# Patient Record
Sex: FEMALE | Race: WHITE | HISPANIC OR LATINO | Employment: FULL TIME | URBAN - METROPOLITAN AREA
[De-identification: names, ages, dates, MRNs, and addresses within clinical notes are randomized per-mention and may not be internally consistent; named-entity substitution may affect disease eponyms.]

---

## 2020-03-17 ENCOUNTER — INITIAL PRENATAL (OUTPATIENT)
Dept: OBGYN | Facility: CLINIC | Age: 20
End: 2020-03-17
Payer: MEDICAID

## 2020-03-17 VITALS — WEIGHT: 164 LBS | DIASTOLIC BLOOD PRESSURE: 68 MMHG | SYSTOLIC BLOOD PRESSURE: 104 MMHG

## 2020-03-17 DIAGNOSIS — N93.8 DUB (DYSFUNCTIONAL UTERINE BLEEDING): ICD-10-CM

## 2020-03-17 DIAGNOSIS — Z32.01 POSITIVE PREGNANCY TEST: ICD-10-CM

## 2020-03-17 LAB
INT CON NEG: NEGATIVE
INT CON POS: POSITIVE
POC URINE PREGNANCY TEST: POSITIVE

## 2020-03-17 PROCEDURE — 81025 URINE PREGNANCY TEST: CPT | Performed by: OBSTETRICS & GYNECOLOGY

## 2020-03-17 PROCEDURE — 76830 TRANSVAGINAL US NON-OB: CPT | Performed by: OBSTETRICS & GYNECOLOGY

## 2020-03-17 PROCEDURE — 99203 OFFICE O/P NEW LOW 30 MIN: CPT | Mod: 25 | Performed by: OBSTETRICS & GYNECOLOGY

## 2020-03-17 NOTE — LETTER
March 17, 2020            Yumiko Obrien is currently being cared for at [facility].  This patient is pregnant and may continue to work. EDC 10/20/20. She should not lift greater than 20 pounds and requires frequent rest periods (10 minutes every two hours).        Thank you,          Wade Bustamante M.D., FACOG

## 2020-03-17 NOTE — PROGRESS NOTES
Subjective:      Yumiko Obrien is a 19 y.o. female who presents with amenorrhea and positive home pregnancy test            HPI patient is a 19-year-old G1 who presents today with amenorrhea and positive home pregnancy test.  Pregnancy is desired and patient was not using any contraception.  She denies any pain, bleeding, spotting or headaches.  Reports normal bowel and bladder functions.  Patient is taking prenatal vitamins.    She reports some mild intermittent nausea but symptoms have been improving    ROS all organ systems were reviewed and were negative except for complaints in HPI       Objective:     /68   Wt 74.4 kg (164 lb)      Physical Exam  Vitals signs and nursing note reviewed. Exam conducted with a chaperone present.   Constitutional:       General: She is not in acute distress.     Appearance: Normal appearance. She is obese. She is not toxic-appearing.   HENT:      Head: Normocephalic and atraumatic.   Eyes:      General:         Right eye: No discharge.         Left eye: No discharge.      Conjunctiva/sclera: Conjunctivae normal.   Neck:      Musculoskeletal: Normal range of motion and neck supple. No neck rigidity.   Cardiovascular:      Rate and Rhythm: Normal rate and regular rhythm.      Pulses: Normal pulses.      Heart sounds: No murmur.   Pulmonary:      Effort: Pulmonary effort is normal. No respiratory distress.      Breath sounds: Normal breath sounds. No wheezing.   Abdominal:      General: Abdomen is flat. Bowel sounds are normal. There is no distension.      Palpations: Abdomen is soft.      Tenderness: There is no abdominal tenderness.   Genitourinary:     General: Normal vulva.      Vagina: No vaginal discharge.   Musculoskeletal: Normal range of motion.      Right lower leg: No edema.      Left lower leg: No edema.   Lymphadenopathy:      Cervical: No cervical adenopathy.   Skin:     General: Skin is warm and dry.      Coloration: Skin is not jaundiced.      Findings: No  bruising.   Neurological:      General: No focal deficit present.      Mental Status: She is alert and oriented to person, place, and time.      Cranial Nerves: No cranial nerve deficit.      Motor: No weakness.   Psychiatric:         Mood and Affect: Mood normal.         Behavior: Behavior normal.         Thought Content: Thought content normal.         Judgment: Judgment normal.            Transvaginal ultrasound was performed and read by me:    Melendez intrauterine pregnancy noted  Fetal heart rate was 173 bpm  Crown-rump length measurement gives a gestational age of 9 weeks and 0-day  EDC by CRL is 10/20/2020  EDC by LMP is 2020  No adnexal masses noted  No pelvic free fluid noted    Impression: Melendez intrauterine pregnancy at 9 weeks gestation with final EDC of 10/20/2020.     Assessment/Plan:       1.  Secondary amenorrhea  19-year-old  1 presenting with secondary amenorrhea and positive home pregnancy test.  Melendez intrauterine pregnancy confirmed by ultrasound at 9 weeks gestation.  Findings are discussed  - POCT Pregnancy  Pregnancy precautions and restrictions were reviewed.  Note given for work for lifting restrictions  Patient counseled on dietary modification to aid in nausea  Patient to continue prenatal vitamins and increase p.o. fluids    2. Positive pregnancy test    3.  Precautions and plan of care reviewed.  Patient to follow-up in 1 week for new OB exam

## 2020-04-14 ENCOUNTER — INITIAL PRENATAL (OUTPATIENT)
Dept: OBGYN | Facility: CLINIC | Age: 20
End: 2020-04-14
Payer: MEDICAID

## 2020-04-14 VITALS
SYSTOLIC BLOOD PRESSURE: 110 MMHG | DIASTOLIC BLOOD PRESSURE: 68 MMHG | HEART RATE: 75 BPM | HEIGHT: 64 IN | WEIGHT: 164.6 LBS | BODY MASS INDEX: 28.1 KG/M2

## 2020-04-14 DIAGNOSIS — O23.40 URINARY TRACT INFECTION AFFECTING PREGNANCY: ICD-10-CM

## 2020-04-14 DIAGNOSIS — Z34.02 ENCOUNTER FOR SUPERVISION OF NORMAL FIRST PREGNANCY, SECOND TRIMESTER: ICD-10-CM

## 2020-04-14 LAB
APPEARANCE UR: NORMAL
BILIRUB UR STRIP-MCNC: NORMAL MG/DL
COLOR UR AUTO: NORMAL
GLUCOSE UR STRIP.AUTO-MCNC: NEGATIVE MG/DL
KETONES UR STRIP.AUTO-MCNC: NEGATIVE MG/DL
LEUKOCYTE ESTERASE UR QL STRIP.AUTO: NORMAL
NITRITE UR QL STRIP.AUTO: POSITIVE
PH UR STRIP.AUTO: 6 [PH] (ref 5–8)
PROT UR QL STRIP: NEGATIVE MG/DL
RBC UR QL AUTO: NEGATIVE
SP GR UR STRIP.AUTO: 1.02
UROBILINOGEN UR STRIP-MCNC: NORMAL MG/DL

## 2020-04-14 PROCEDURE — 81002 URINALYSIS NONAUTO W/O SCOPE: CPT | Performed by: ADVANCED PRACTICE MIDWIFE

## 2020-04-14 PROCEDURE — 59402 PR NEW OB HIGH RISK: CPT | Performed by: ADVANCED PRACTICE MIDWIFE

## 2020-04-14 RX ORDER — NITROFURANTOIN 25; 75 MG/1; MG/1
100 CAPSULE ORAL 2 TIMES DAILY
Qty: 14 CAP | Refills: 0 | Status: SHIPPED | OUTPATIENT
Start: 2020-04-14 | End: 2020-09-08

## 2020-04-14 NOTE — PROGRESS NOTES
Subjective:   Yumiko Obrien is a 19 y.o.  who presents for her new OB exam.  She is 13w0d with an KATHLEEN of Estimated Date of Delivery: 10/20/20 by US. She is feeling well and has no concerns at this time. Denies VB, LOF, contractions or pain. She reports no ER visits or previous care in this pregnancy. Denies dysuria, vaginal DC, fever. Reports absent fetal movement. Unsure AFP.  Declines CF.      History reviewed. No pertinent past medical history.    Psych Hx: Patient denies any history of depression, anxiety, PTSD, bipolar or any other psychological issues.     History reviewed. No pertinent surgical history.     OB History    Para Term  AB Living   1             SAB TAB Ectopic Molar Multiple Live Births                    # Outcome Date GA Lbr Joel/2nd Weight Sex Delivery Anes PTL Lv   1 Current                 Gynecological Hx: Denies any hx of STIs, including HSV. Denies any vulvovaginal disorders and no hx of abnormal cervical cytology. Last pap never    Sexual Hx: One current male partner, who is  FOB     Family History   Problem Relation Age of Onset   • No Known Problems Mother    • No Known Problems Father    • No Known Problems Sister    • No Known Problems Brother    • Diabetes Maternal Grandmother      Denies any genetic disorders in family history.     Social History     Socioeconomic History   • Marital status: Single     Spouse name: Not on file   • Number of children: Not on file   • Years of education: Not on file   • Highest education level: Not on file   Occupational History   • Not on file   Social Needs   • Financial resource strain: Not on file   • Food insecurity     Worry: Not on file     Inability: Not on file   • Transportation needs     Medical: Not on file     Non-medical: Not on file   Tobacco Use   • Smoking status: Never Smoker   • Smokeless tobacco: Never Used   Substance and Sexual Activity   • Alcohol use: Not Currently   • Drug use: Not Currently   • Sexual  "activity: Yes     Partners: Male     Birth control/protection: None     Comment: none   Lifestyle   • Physical activity     Days per week: Not on file     Minutes per session: Not on file   • Stress: Not on file   Relationships   • Social connections     Talks on phone: Not on file     Gets together: Not on file     Attends Mandaen service: Not on file     Active member of club or organization: Not on file     Attends meetings of clubs or organizations: Not on file     Relationship status: Not on file   • Intimate partner violence     Fear of current or ex partner: Not on file     Emotionally abused: Not on file     Physically abused: Not on file     Forced sexual activity: Not on file   Other Topics Concern   • Not on file   Social History Narrative   • Not on file       FOB is involved and lives with Yumiko Obrien.  Pregnancy is un planned but desired.    She is currently  working as  at [employer] , denies any heavy lifting or exposure to potential teratogens like environmental or occupational toxins.   Denies alcohol use, drug use, or tobacco use in pregnancy.   Denies any current or hx of sexual, emotional or physical abuse or trauma.     Current Medications: PNV  Allergies: Denies allergies to medications, food, or environmental allergies    Objective:      Vitals:    04/14/20 1521   Weight: 74.7 kg (164 lb 9.6 oz)   Height: 1.626 m (5' 4\")        See Prenatal Physical and Prenatal Vitals  UA WNL today      Assessment:      1.  IUP @ 13w0d per US      2.  S=D      3.  See problem list as follows     There are no active problems to display for this patient.        Plan:   - Discussed with patient that at current, there are no true recommendations regarding exposure to COVID outside of the importance of decreasing transmission. There are no long term studies indicating if there are any effects on the pregnancy. At current, she may continue working. She is to report any fever of unknown origin.     -UTI " noted today and Rx sent to pharmacy. Educated patient on this. No sex while taking the medication and we will perform AYANNA at next visit.     -  GC/CT off urine  - Prenatal labs ordered - lab slip provided  - Discussed PNV, nutrition, adequate water intake, and exercise/weight gain in pregnancy  - NOB informational packet with anticipatory guidance given  - Reviewed [facility] and patient is loren. Wanda to offer.  - S/sx of pregnancy warning signs and PTL precautions given  - Complete OB US in 7 wks  - Return to clinic in 4 weeks.

## 2020-04-14 NOTE — PROGRESS NOTES
Pt. Here for NOB visit.  # [phone]  Pt had a  on 3/17/2020  Pt. States no complaints.   Pharmacy verified.   Chaperone offered and not needed.

## 2020-04-14 NOTE — LETTER
Cystic Fibrosis Carrier Testing  Yumiko Obrien    The following information is about a blood test that can be done to determine if you and/or your partner carry the gene for cystic fibrosis.    WHAT IS CYSTIC FIBROSIS?  · Cystic fibrosis (CF) is an inherited disease that affects more than 25,000 American children and young adults.  · Symptoms of CF vary but include lung congestion, pneumonia, diarrhea and poor growth.  Most people with CF have severe medical problems and some die at a young age.  Others have so few symptoms they are unaware they have CF.  · CF does not affect intelligence.  · Although there is no cure for CF at this time, scientists are making progress in improving treatment and in searching for a cure.  In the past many people with CF  at a very young age.  Today, many are living into their 20’s and 30’s.    IS THERE A CHANCE MY BABY COULD HAVE CYSTIC FIBROSIS?  · You can have a child with CF even if there is no history in your family (see chart below).  · CF testing can help determine if you are a carrier and at risk to have a child with CF.  Note: if both parents are carriers, there is a 1 in 4 (25%) chance with each pregnancy that they will have a child with CF.  · Carriers have one normal CF gene and one altered CF gene.  · People with CF have two altered CF genes.  · Most people have two normal copies of the CF gene.    Approximate risk that a couple with no family history of cystic fibrosis will have a child with cystic fibrosis:    Ethnic background / Risk     couple:  1 in 2,500   couple:  1 in 15,000            couple:  1 in 8,000     American couple:  1 in 32,000     WHAT TESTING IS AVAILABLE?  · There is a blood test that can be done to find out if you or your partner is a carrier.  · It is important to understand that CF carrier testing does not detect all CF carriers.  · If the test shows that you are both CF carriers, you unborn baby can be  tested to find out if the baby has CF.    HOW MUCH DOES IT COST TO HAVE CYSTIC FIBROSIS CARRIER TESTING?  · Cost and insurance coverage for CF carrier testing vary depending upon the laboratory used and your insurance policy.  · The average cost for CF carrier testing is $300 per person.  · Your genetic counselor can provide you with more information about cystic fibrosis carrier testing.    _____  Yes, I am interested in discussing carrier testing with a genetic counselor.    _____  No, I am not interested in CF carrier testing or in receiving more information about CF carrier testing.      Client signature: ________________________________________  4/14/2020

## 2020-04-20 ENCOUNTER — HOSPITAL ENCOUNTER (OUTPATIENT)
Dept: LAB | Facility: MEDICAL CENTER | Age: 20
End: 2020-04-20
Attending: ADVANCED PRACTICE MIDWIFE
Payer: MEDICAID

## 2020-04-20 DIAGNOSIS — Z34.02 ENCOUNTER FOR SUPERVISION OF NORMAL FIRST PREGNANCY, SECOND TRIMESTER: ICD-10-CM

## 2020-04-20 LAB
ABO GROUP BLD: NORMAL
APPEARANCE UR: CLEAR
BACTERIA #/AREA URNS HPF: ABNORMAL /HPF
BASOPHILS # BLD AUTO: 0.5 % (ref 0–1.8)
BASOPHILS # BLD: 0.05 K/UL (ref 0–0.12)
BILIRUB UR QL STRIP.AUTO: NEGATIVE
BLD GP AB SCN SERPL QL: NORMAL
COLOR UR: YELLOW
EOSINOPHIL # BLD AUTO: 0.35 K/UL (ref 0–0.51)
EOSINOPHIL NFR BLD: 3.5 % (ref 0–6.9)
EPI CELLS #/AREA URNS HPF: ABNORMAL /HPF
ERYTHROCYTE [DISTWIDTH] IN BLOOD BY AUTOMATED COUNT: 43.1 FL (ref 35.9–50)
GLUCOSE UR STRIP.AUTO-MCNC: NEGATIVE MG/DL
HBV SURFACE AG SER QL: ABNORMAL
HCT VFR BLD AUTO: 38.1 % (ref 37–47)
HCV AB SER QL: NORMAL
HGB BLD-MCNC: 12.3 G/DL (ref 12–16)
HIV 1+2 AB+HIV1 P24 AG SERPL QL IA: NORMAL
HYALINE CASTS #/AREA URNS LPF: ABNORMAL /LPF
IMM GRANULOCYTES # BLD AUTO: 0.07 K/UL (ref 0–0.11)
IMM GRANULOCYTES NFR BLD AUTO: 0.7 % (ref 0–0.9)
KETONES UR STRIP.AUTO-MCNC: NEGATIVE MG/DL
LEUKOCYTE ESTERASE UR QL STRIP.AUTO: ABNORMAL
LYMPHOCYTES # BLD AUTO: 2.25 K/UL (ref 1–4.8)
LYMPHOCYTES NFR BLD: 22.5 % (ref 22–41)
MCH RBC QN AUTO: 25.7 PG (ref 27–33)
MCHC RBC AUTO-ENTMCNC: 32.3 G/DL (ref 33.6–35)
MCV RBC AUTO: 79.5 FL (ref 81.4–97.8)
MICRO URNS: ABNORMAL
MONOCYTES # BLD AUTO: 0.9 K/UL (ref 0–0.85)
MONOCYTES NFR BLD AUTO: 9 % (ref 0–13.4)
NEUTROPHILS # BLD AUTO: 6.39 K/UL (ref 2–7.15)
NEUTROPHILS NFR BLD: 63.8 % (ref 44–72)
NITRITE UR QL STRIP.AUTO: NEGATIVE
NRBC # BLD AUTO: 0 K/UL
NRBC BLD-RTO: 0 /100 WBC
PH UR STRIP.AUTO: 6.5 [PH] (ref 5–8)
PLATELET # BLD AUTO: 286 K/UL (ref 164–446)
PMV BLD AUTO: 10.4 FL (ref 9–12.9)
PROT UR QL STRIP: NEGATIVE MG/DL
RBC # BLD AUTO: 4.79 M/UL (ref 4.2–5.4)
RBC # URNS HPF: ABNORMAL /HPF
RBC UR QL AUTO: NEGATIVE
RH BLD: NORMAL
RUBV AB SER QL: 78.5 IU/ML
SP GR UR STRIP.AUTO: 1.01
TREPONEMA PALLIDUM IGG+IGM AB [PRESENCE] IN SERUM OR PLASMA BY IMMUNOASSAY: ABNORMAL
UROBILINOGEN UR STRIP.AUTO-MCNC: 0.2 MG/DL
WBC # BLD AUTO: 10 K/UL (ref 4.8–10.8)
WBC #/AREA URNS HPF: ABNORMAL /HPF

## 2020-04-20 PROCEDURE — 86901 BLOOD TYPING SEROLOGIC RH(D): CPT

## 2020-04-20 PROCEDURE — 86850 RBC ANTIBODY SCREEN: CPT

## 2020-04-20 PROCEDURE — 86780 TREPONEMA PALLIDUM: CPT

## 2020-04-20 PROCEDURE — 86900 BLOOD TYPING SEROLOGIC ABO: CPT

## 2020-04-20 PROCEDURE — 81001 URINALYSIS AUTO W/SCOPE: CPT | Mod: XU

## 2020-04-20 PROCEDURE — 85025 COMPLETE CBC W/AUTO DIFF WBC: CPT

## 2020-04-20 PROCEDURE — 36415 COLL VENOUS BLD VENIPUNCTURE: CPT

## 2020-04-20 PROCEDURE — 87591 N.GONORRHOEAE DNA AMP PROB: CPT

## 2020-04-20 PROCEDURE — 87389 HIV-1 AG W/HIV-1&-2 AB AG IA: CPT

## 2020-04-20 PROCEDURE — 86762 RUBELLA ANTIBODY: CPT

## 2020-04-20 PROCEDURE — 87340 HEPATITIS B SURFACE AG IA: CPT

## 2020-04-20 PROCEDURE — 87491 CHLMYD TRACH DNA AMP PROBE: CPT

## 2020-04-20 PROCEDURE — 80307 DRUG TEST PRSMV CHEM ANLYZR: CPT

## 2020-04-20 PROCEDURE — 86803 HEPATITIS C AB TEST: CPT

## 2020-04-21 LAB
C TRACH DNA SPEC QL NAA+PROBE: NEGATIVE
N GONORRHOEA DNA SPEC QL NAA+PROBE: NEGATIVE
SPECIMEN SOURCE: NORMAL

## 2020-04-22 LAB
AMPHET CTO UR CFM-MCNC: NEGATIVE NG/ML
BARBITURATES CTO UR CFM-MCNC: NEGATIVE NG/ML
BENZODIAZ CTO UR CFM-MCNC: NEGATIVE NG/ML
CANNABINOIDS CTO UR CFM-MCNC: NEGATIVE NG/ML
COCAINE CTO UR CFM-MCNC: NEGATIVE NG/ML
DRUG COMMENT 753798: NORMAL
METHADONE CTO UR CFM-MCNC: NEGATIVE NG/ML
OPIATES CTO UR CFM-MCNC: NEGATIVE NG/ML
PCP CTO UR CFM-MCNC: NEGATIVE NG/ML
PROPOXYPH CTO UR CFM-MCNC: NEGATIVE NG/ML

## 2020-05-12 ENCOUNTER — ROUTINE PRENATAL (OUTPATIENT)
Dept: OBGYN | Facility: CLINIC | Age: 20
End: 2020-05-12
Payer: MEDICAID

## 2020-05-12 VITALS — SYSTOLIC BLOOD PRESSURE: 100 MMHG | DIASTOLIC BLOOD PRESSURE: 66 MMHG | WEIGHT: 164 LBS | BODY MASS INDEX: 28.15 KG/M2

## 2020-05-12 DIAGNOSIS — Z34.02 SUPERVISION OF NORMAL FIRST PREGNANCY IN SECOND TRIMESTER: Primary | ICD-10-CM

## 2020-05-12 PROCEDURE — 90040 PR PRENATAL FOLLOW UP: CPT | Performed by: NURSE PRACTITIONER

## 2020-05-12 NOTE — PROGRESS NOTES
S) Pt is a 19 y.o.   at 17w0d  gestation. Routine prenatal care today. Was treated for UTI last appt. Feeling better no symptoms  To report today.  She has a single episode of vomiting over the weekend but attributes it to drinking coffee at night then having food right after then laying down to go to sleep.    Fetal movement Normal  Cramping no  VB no  LOF no   Denies dysuria. Generally feels well today. Good self-care activities identified. Denies headaches, swelling, visual changes, or epigastric pain .     O) There were no vitals taken for this visit.        Labs:       PNL: WNL       GCT:       AFP: Not Examined       GBS: N/A       Pertinent ultrasound -            A) IUP at 17w0d       S=D         Patient Active Problem List    Diagnosis Date Noted   • Urinary tract infection affecting pregnancy 2020          SVE: deferred         TDAP: no       FLU: no        BTL: no       : no       C/S Consent: no       IOL or C/S scheduled: no       LAST PAP: def d/t age         P) s/s ptl vs general discomforts. Fetal movements reviewed. General ed and anticipatory guidance. Nutrition/exercise/vitamin. Plans breast Plans pp contraception- unsure  Continue PNV.   Discussed at length the AFP test.  At this time she would like to defer, but is aware we may recommend it if any abnormalities noted in her US.   RTC 4 weeks or PRN.

## 2020-06-02 ENCOUNTER — APPOINTMENT (OUTPATIENT)
Dept: RADIOLOGY | Facility: IMAGING CENTER | Age: 20
End: 2020-06-02
Attending: ADVANCED PRACTICE MIDWIFE
Payer: MEDICAID

## 2020-06-02 DIAGNOSIS — Z34.02 ENCOUNTER FOR SUPERVISION OF NORMAL FIRST PREGNANCY, SECOND TRIMESTER: ICD-10-CM

## 2020-06-02 PROCEDURE — 76805 OB US >/= 14 WKS SNGL FETUS: CPT | Mod: TC | Performed by: OBSTETRICS & GYNECOLOGY

## 2020-06-04 ENCOUNTER — TELEPHONE (OUTPATIENT)
Dept: OBGYN | Facility: CLINIC | Age: 20
End: 2020-06-04

## 2020-06-04 NOTE — TELEPHONE ENCOUNTER
Pt called requesting info on where to get tested for COVID. Pt stated that her mother in law tested + for COVID on 6/3/2019. Per Dr. Martinez, advised pt to go get tested and let them know she was exposed from mother in law two days prior. Informed pt she would have to contact the health department for testing. Pt understood and had no further questions or concerns.

## 2020-06-09 ENCOUNTER — ROUTINE PRENATAL (OUTPATIENT)
Dept: OBGYN | Facility: CLINIC | Age: 20
End: 2020-06-09
Payer: MEDICAID

## 2020-06-09 DIAGNOSIS — U07.1 REAL TIME REVERSE TRANSCRIPTASE PCR POSITIVE FOR COVID-19 VIRUS: Primary | ICD-10-CM

## 2020-06-09 PROCEDURE — 90040 PR PRENATAL FOLLOW UP: CPT | Performed by: NURSE PRACTITIONER

## 2020-06-09 NOTE — PROGRESS NOTES
Pt not seen today. She reports she tested positive for COVID 2 days ago. Has her results on her phone.  Pt instructed on warning s/s and when to follow up here.  Report to hospital for any concerns.  Pt educated that she is COVID positive and needs to self-isolate.     Consulted w Dr. Carlos regarding pt and POC.

## 2020-06-09 NOTE — PROGRESS NOTES
OB follow up   + fetal movement.  No VB, LOF or UC's.  Wt:       BP:  Phone #   Preferred pharmacy confirmed.

## 2020-07-01 ENCOUNTER — ROUTINE PRENATAL (OUTPATIENT)
Dept: OBGYN | Facility: CLINIC | Age: 20
End: 2020-07-01
Payer: MEDICAID

## 2020-07-01 VITALS — DIASTOLIC BLOOD PRESSURE: 62 MMHG | WEIGHT: 168.5 LBS | BODY MASS INDEX: 28.92 KG/M2 | SYSTOLIC BLOOD PRESSURE: 110 MMHG

## 2020-07-01 DIAGNOSIS — U07.1 COVID-19 VIRUS INFECTION: ICD-10-CM

## 2020-07-01 DIAGNOSIS — O09.93 SUPERVISION OF HIGH RISK PREGNANCY IN THIRD TRIMESTER: ICD-10-CM

## 2020-07-01 PROCEDURE — 90040 PR PRENATAL FOLLOW UP: CPT | Performed by: OBSTETRICS & GYNECOLOGY

## 2020-07-01 NOTE — PROGRESS NOTES
19 y.o.  24w1d The patient is here for routine obstetrical followup. She reports good fetal movement. She denies contractions, vaginal bleeding, or leaking of fluid.  She tested positive for COVID 19 on 2020.  She states she had some mild cold-like symptoms at the end of May.  She had the test performed because her mother-in-law tested positive for COVID.  Currently, she is feeling well.  She denies shortness of breath, chest pain, fevers, cough, headaches, diarrhea, rhinorrhea.    The patient's pregnancy is complicated by   Patient Active Problem List    Diagnosis Date Noted   • COVID-19 virus infection 2020   • Urinary tract infection affecting pregnancy 2020     Fetal survey reviewed -within normal limits  28-week labs ordered  Advised patient that Dr. Bach will be calling her to arrange getting her a pulse oximeter to check nightly O2 saturations.    Followup in 4 weeks   labor precautions were discussed with patient  Fetal kick counts were discussed with patient

## 2020-07-01 NOTE — PROGRESS NOTES
Pt. Here for OB/FU. Reports Good FM.   Good # [phone]  Pt. Denies VB, LOF, or UC's.   Pharmacy verified.   Chaperone offered and not indicated  Pt states having some cramping, and nose bleeds

## 2020-07-04 ENCOUNTER — HOSPITAL ENCOUNTER (OUTPATIENT)
Facility: MEDICAL CENTER | Age: 20
End: 2020-07-04
Attending: OBSTETRICS & GYNECOLOGY | Admitting: OBSTETRICS & GYNECOLOGY
Payer: MEDICAID

## 2020-07-04 ENCOUNTER — HOSPITAL ENCOUNTER (EMERGENCY)
Facility: MEDICAL CENTER | Age: 20
End: 2020-07-04
Payer: MEDICAID

## 2020-07-04 VITALS
BODY MASS INDEX: 28.77 KG/M2 | HEIGHT: 64 IN | OXYGEN SATURATION: 97 % | WEIGHT: 168.5 LBS | DIASTOLIC BLOOD PRESSURE: 77 MMHG | RESPIRATION RATE: 17 BRPM | SYSTOLIC BLOOD PRESSURE: 119 MMHG | HEART RATE: 85 BPM | TEMPERATURE: 98.3 F

## 2020-07-04 LAB
APPEARANCE UR: CLEAR
COLOR UR AUTO: NORMAL
GLUCOSE UR QL STRIP.AUTO: NEGATIVE MG/DL
KETONES UR QL STRIP.AUTO: NEGATIVE MG/DL
LEUKOCYTE ESTERASE UR QL STRIP.AUTO: NEGATIVE
NITRITE UR QL STRIP.AUTO: NEGATIVE
PH UR STRIP.AUTO: 6.5 [PH] (ref 5–8)
PROT UR QL STRIP: NEGATIVE MG/DL
RBC UR QL AUTO: NEGATIVE
SP GR UR STRIP.AUTO: 1.02 (ref 1–1.03)

## 2020-07-04 PROCEDURE — 81002 URINALYSIS NONAUTO W/O SCOPE: CPT

## 2020-07-04 PROCEDURE — 302447 STATCHG NO CHARGE

## 2020-07-05 ENCOUNTER — PATIENT MESSAGE (OUTPATIENT)
Dept: OBGYN | Facility: CLINIC | Age: 20
End: 2020-07-05

## 2020-07-05 NOTE — PROGRESS NOTES
"1944-Pt presents to L&D c/o continuous cramping today that is \"very painful\". Denies regular UC's, LOF or VB and confirms +FM. TOCO and EFM applied. VS taken. Educated pt on importance of staying hydrated and increasing fluid intake especially during hot weather. Pt did test positive for COVID beginning of June. Pt reported having no symptoms since the last week of May.   2005-Updated Dr. Chavez in department on pt arrival/complaint/status, orders to orally hydrate, may discharge pt home if feeling better after one hour.  2054-Pt reports feeling better and that cramping has gone away, would like to go home and states \"I'm getting tired\". Will discharge home per previous verbal order  2059-Pt discharged home with FOB, ambulatory and undelivered. Provided general instructions, PTL precautions and kick count instructions, understanding verbalized  "

## 2020-07-07 ENCOUNTER — TELEPHONE (OUTPATIENT)
Dept: OBGYN | Facility: CLINIC | Age: 20
End: 2020-07-07

## 2020-07-07 NOTE — TELEPHONE ENCOUNTER
07/07/20 1:27 PM    Pt returned call, states feels better, gave precautions and advised her as to below on what was advised by the midwife. Pt understood and had no further questions.

## 2020-07-07 NOTE — TELEPHONE ENCOUNTER
----- Message from Yumiko Obrien sent at 7/5/2020  9:17 PM PDT -----  Regarding: Non-Urgent Medical Question  Contact: [phone]  So Saturday on fourth of july around 7 donita I went to the hospital due to a really bad cramp pains. They told me it might be dehydration but I am still feeling a pain, I have trouble trying to stand up sometimes because it hurts but what else can I do? Like on all the bottom of my stomach    7/7/2020 1319 called pt and left a message to call us back. I will also send [facility] message. Consulted with Ryland and advised to check with pt if she is drinking plenty of water, and no sex in the past 48hrs if pt has not had sex in the past 48hrs and pt is drinking plenty of water for pt to go to L&D for possible IV hydration and assessment.

## 2020-07-16 ENCOUNTER — HOSPITAL ENCOUNTER (OUTPATIENT)
Dept: LAB | Facility: MEDICAL CENTER | Age: 20
End: 2020-07-16
Attending: OBSTETRICS & GYNECOLOGY
Payer: MEDICAID

## 2020-07-16 DIAGNOSIS — O09.93 SUPERVISION OF HIGH RISK PREGNANCY IN THIRD TRIMESTER: ICD-10-CM

## 2020-07-16 LAB
ERYTHROCYTE [DISTWIDTH] IN BLOOD BY AUTOMATED COUNT: 44.8 FL (ref 35.9–50)
GLUCOSE 1H P 50 G GLC PO SERPL-MCNC: 93 MG/DL (ref 70–139)
HCT VFR BLD AUTO: 41.2 % (ref 37–47)
HGB BLD-MCNC: 12.9 G/DL (ref 12–16)
MCH RBC QN AUTO: 25.9 PG (ref 27–33)
MCHC RBC AUTO-ENTMCNC: 31.3 G/DL (ref 33.6–35)
MCV RBC AUTO: 82.6 FL (ref 81.4–97.8)
PLATELET # BLD AUTO: 394 K/UL (ref 164–446)
PMV BLD AUTO: 9.9 FL (ref 9–12.9)
RBC # BLD AUTO: 4.99 M/UL (ref 4.2–5.4)
TREPONEMA PALLIDUM IGG+IGM AB [PRESENCE] IN SERUM OR PLASMA BY IMMUNOASSAY: NORMAL
WBC # BLD AUTO: 12.7 K/UL (ref 4.8–10.8)

## 2020-07-16 PROCEDURE — 82950 GLUCOSE TEST: CPT

## 2020-07-16 PROCEDURE — 86780 TREPONEMA PALLIDUM: CPT

## 2020-07-16 PROCEDURE — 85027 COMPLETE CBC AUTOMATED: CPT

## 2020-07-16 PROCEDURE — 36415 COLL VENOUS BLD VENIPUNCTURE: CPT

## 2020-07-21 DIAGNOSIS — O12.03 GESTATIONAL EDEMA IN THIRD TRIMESTER: ICD-10-CM

## 2020-07-21 NOTE — PROGRESS NOTES
Called patient to follow-up on positive COVID status while pregnant.  Patient reports new swelling of hands that was not present previously.  Also has significant edema in her lower extremities which is also new for her.  She denies any headache, vision changes or shortness of breath or right upper quadrant pain.  Recommend she get preeclampsia labs as COVID has been known to cause vascular disease and increased risk for preeclampsia.  Low concern at this time that she has preeclampsia as all other symptoms are negative, but was advised to go to labor and delivery if any new changes occur.  Has an appointment on 7/27 with Dr. Waters and encouraged to not miss this appointment for evaluation.

## 2020-07-23 ENCOUNTER — HOSPITAL ENCOUNTER (OUTPATIENT)
Dept: LAB | Facility: MEDICAL CENTER | Age: 20
End: 2020-07-23
Attending: OBSTETRICS & GYNECOLOGY
Payer: MEDICAID

## 2020-07-23 DIAGNOSIS — O12.03 GESTATIONAL EDEMA IN THIRD TRIMESTER: ICD-10-CM

## 2020-07-23 LAB
ALT SERPL-CCNC: 19 U/L (ref 2–50)
AST SERPL-CCNC: 23 U/L (ref 12–45)
CREAT SERPL-MCNC: 0.44 MG/DL (ref 0.5–1.4)
CREAT UR-MCNC: 81.47 MG/DL
ERYTHROCYTE [DISTWIDTH] IN BLOOD BY AUTOMATED COUNT: 43.4 FL (ref 35.9–50)
HCT VFR BLD AUTO: 36.3 % (ref 37–47)
HGB BLD-MCNC: 11.5 G/DL (ref 12–16)
MCH RBC QN AUTO: 25.7 PG (ref 27–33)
MCHC RBC AUTO-ENTMCNC: 31.7 G/DL (ref 33.6–35)
MCV RBC AUTO: 81.2 FL (ref 81.4–97.8)
PLATELET # BLD AUTO: 299 K/UL (ref 164–446)
PMV BLD AUTO: 10.1 FL (ref 9–12.9)
PROT UR-MCNC: 9 MG/DL (ref 0–15)
PROT/CREAT UR: 110 MG/G (ref 10–107)
RBC # BLD AUTO: 4.47 M/UL (ref 4.2–5.4)
WBC # BLD AUTO: 10.6 K/UL (ref 4.8–10.8)

## 2020-07-23 PROCEDURE — 84156 ASSAY OF PROTEIN URINE: CPT

## 2020-07-23 PROCEDURE — 82565 ASSAY OF CREATININE: CPT

## 2020-07-23 PROCEDURE — 85027 COMPLETE CBC AUTOMATED: CPT

## 2020-07-23 PROCEDURE — 84460 ALANINE AMINO (ALT) (SGPT): CPT

## 2020-07-23 PROCEDURE — 36415 COLL VENOUS BLD VENIPUNCTURE: CPT

## 2020-07-23 PROCEDURE — 82570 ASSAY OF URINE CREATININE: CPT

## 2020-07-23 PROCEDURE — 84450 TRANSFERASE (AST) (SGOT): CPT

## 2020-07-26 NOTE — PROGRESS NOTES
JULITA:  27w6d    Pt reports doing well.  Reports +FM, Denies vaginal bleeding, contractions, LOF.    /86   Wt 84.4 kg (186 lb)   LMP 2020   BMI 31.93 kg/m²   gen: AAO, NAD  FHTs: 130s  FH: 29cm      A/P: 20 y.o.  @ 27w6d     Estimated Date of Delivery: 10/20/20 by 9wk US  Rh+/-, PNL wnl   Declines aneuploidy screening  Anatomy wnl  glucola wnl      COVID: tested positive early , never was symptomatic, tested only for contact with COVID positive relative.    Growth US ordered given COVID + in pregnancy, discussed with patient there is not really good recommendations regarding management.  Patient was never symptomatic but still recommend at least a one-time growth ultrasound.  Fundal height measuring appropriately.    RTC 2wks    Jayla Dowd MD  [facility], Women's Health

## 2020-07-27 ENCOUNTER — TELEPHONE (OUTPATIENT)
Dept: OBGYN | Facility: CLINIC | Age: 20
End: 2020-07-27

## 2020-07-27 ENCOUNTER — ROUTINE PRENATAL (OUTPATIENT)
Dept: OBGYN | Facility: CLINIC | Age: 20
End: 2020-07-27
Payer: MEDICAID

## 2020-07-27 VITALS — WEIGHT: 186 LBS | SYSTOLIC BLOOD PRESSURE: 126 MMHG | DIASTOLIC BLOOD PRESSURE: 86 MMHG | BODY MASS INDEX: 31.93 KG/M2

## 2020-07-27 DIAGNOSIS — O09.93 SUPERVISION OF HIGH RISK PREGNANCY IN THIRD TRIMESTER: ICD-10-CM

## 2020-07-27 DIAGNOSIS — U07.1 COVID-19 VIRUS INFECTION: ICD-10-CM

## 2020-07-27 PROCEDURE — 90040 PR PRENATAL FOLLOW UP: CPT | Performed by: OBSTETRICS & GYNECOLOGY

## 2020-07-27 PROCEDURE — 90471 IMMUNIZATION ADMIN: CPT | Performed by: OBSTETRICS & GYNECOLOGY

## 2020-07-27 PROCEDURE — 90715 TDAP VACCINE 7 YRS/> IM: CPT | Performed by: OBSTETRICS & GYNECOLOGY

## 2020-07-27 ASSESSMENT — FIBROSIS 4 INDEX: FIB4 SCORE: 0.37

## 2020-07-27 NOTE — PROGRESS NOTES
Pt here for OB f/u. Denies VB, LOF Reports +FM and swelling on feet. Pt states not drinking a lot of water.   Good phone# [phone]  Pharmacy verified with pt.  Pt declines BTL  Pt states she would like to read about TDap and then will get it next visit. VIS given to pt to review.

## 2020-07-27 NOTE — TELEPHONE ENCOUNTER
Called pt. Regarding the My Chart message she sent on 07/23/2020 (I'm still swollen and it seems like I'm also a bit from my face, what can I do meanwhile I get my results?m still swollen and it seems like I'm also a bit from my face, what can I do meanwhile I get my results?).  Pt stated no one has returned her message. But that she has an appt with Dr. Kinney today.  I apologized to pt. And ask about her swelling. I told pt that Dr. Kinney reviewed her preeclampsia labs and are negative. Pt stated she saw the message. Stated her swelling on her face is gone, but her feet are swollen. I advised pt to elevate her feet during the day, try cut back on her salt intake, drink plenty of water, try maternity compression stockings to help with her swelling. Pt verbalized understanding and had no further questions.

## 2020-07-27 NOTE — LETTER
"Count Your Baby's Movements  Another step to a healthy delivery                 Dept: [phone]    How Many Weeks Pregnant? Unknown    Date to Begin Countin2020              How to use this chart    One way for your physician to keep track of your baby's health is by knowing how often the baby moves (or \"kicks\") in your womb.  You can help your physician to do this by using this chart every day.    Every day, you should see how many hours it takes for your baby to move 10 times.  Start in the morning, as soon as you get up.    · First, write down the time your baby moves until you get to 10.  · Check off one box every time your baby moves until you get to 10.  · Write down the time you finished counting in the last column.  · Total how long it took to count up all 10 movements.  · Finally, fill in the box that shows how long this took.  After counting 10 movements, you no longer have to count any more that day.  The next morning, just start counting again as soon as you get up.    What should you call a \"movement\"?  It is hard to say, because it will feel different from one mother to another and from one pregnancy to the next.  The important thing is that you count the movements the same way throughout your pregnancy.  If you have more questions, you should ask your physician.    Count carefully every day!  SAMPLE:  Week 28    How many hours did it take to feel 10 movements?       Start  Time     1     2     3     4     5     6     7     8     9     10   Finish Time   Mon 8:20 ·  ·  ·  ·  ·  ·  ·  ·  ·  ·  11:40                  Sat               Sun                 IMPORTANT: You should contact your physician if it takes more than two hours for you to feel 10 movements.  Each morning, write down the time and start to count the movements of your baby.  Keep track by checking off one box every time you feel one movement.  When you have felt 10 " "\"kicks\", write down the time you finished counting in the last column.  Then fill in the   box (over the check enedina) for the number of hours it took.  Be sure to read the complete instructions on the previous page.            "

## 2020-08-10 ENCOUNTER — ROUTINE PRENATAL (OUTPATIENT)
Dept: OBGYN | Facility: CLINIC | Age: 20
End: 2020-08-10
Payer: MEDICAID

## 2020-08-10 VITALS — BODY MASS INDEX: 31.58 KG/M2 | SYSTOLIC BLOOD PRESSURE: 140 MMHG | WEIGHT: 184 LBS | DIASTOLIC BLOOD PRESSURE: 92 MMHG

## 2020-08-10 DIAGNOSIS — Z34.03 NORMAL FIRST PREGNANCY IN THIRD TRIMESTER: ICD-10-CM

## 2020-08-10 PROCEDURE — 90040 PR PRENATAL FOLLOW UP: CPT | Performed by: OBSTETRICS & GYNECOLOGY

## 2020-08-10 ASSESSMENT — FIBROSIS 4 INDEX: FIB4 SCORE: 0.37

## 2020-08-10 NOTE — PROGRESS NOTES
S: Pt presents for routine OB follow up.  No contractions, vaginal bleeding, or leaking fluids. Good fetal movement.  No complaints    O: /92   Wt 83.5 kg (184 lb)   LMP 2020   BMI 31.58 kg/m²   Vitals:    08/10/20 0854   BP: 140/92   Weight: 83.5 kg (184 lb)     Vitals, fundal height , fetal position, and FHR reviewed on flowsheet    Patient Active Problem List   Diagnosis   • Urinary tract infection affecting pregnancy   • COVID-19 virus infection       Lab:  Recent Labs     20  1700   ABOGROUP O   RUBELLAIGG 78.50   HEPBSAG Non-Reactive   HEPCAB Non-Reactive       A/P:  20 y.o.  at 29w6d presents for routine obstetric follow-up.     #Prenatal care  - Continue prenatal vitamins.  Estimated Date of Delivery: 10/20/20 by 9wk US  Rh+/-, PNL wnl   Declines aneuploidy screening  Anatomy wnl  glucola wnl    COVID: tested positive early , never was symptomatic, tested only for contact with COVID positive relative.  [ ] growth US sched     Tdap: 2020      - Follow-up: 4wks

## 2020-08-10 NOTE — NON-PROVIDER
OB follow up   + fetal movement.  No VB, LOF or UC's.  Wt: 184      BP: 140/92  Phone # [phone]  Patient states no complaints today.  Preferred pharmacy confirmed.  Growth US on 08/13/2020

## 2020-08-13 ENCOUNTER — APPOINTMENT (OUTPATIENT)
Dept: RADIOLOGY | Facility: IMAGING CENTER | Age: 20
End: 2020-08-13
Attending: OBSTETRICS & GYNECOLOGY
Payer: MEDICAID

## 2020-08-13 DIAGNOSIS — U07.1 COVID-19 VIRUS INFECTION: ICD-10-CM

## 2020-08-13 DIAGNOSIS — O09.93 SUPERVISION OF HIGH RISK PREGNANCY IN THIRD TRIMESTER: ICD-10-CM

## 2020-08-13 PROCEDURE — 76816 OB US FOLLOW-UP PER FETUS: CPT | Mod: TC | Performed by: OBSTETRICS & GYNECOLOGY

## 2020-08-17 ENCOUNTER — TELEPHONE (OUTPATIENT)
Dept: OBGYN | Facility: CLINIC | Age: 20
End: 2020-08-17

## 2020-08-17 NOTE — TELEPHONE ENCOUNTER
Pt was wondering why she needed a growth scan again. Let her know what Dr Waters had said and why she needed it.

## 2020-09-08 ENCOUNTER — ROUTINE PRENATAL (OUTPATIENT)
Dept: OBGYN | Facility: CLINIC | Age: 20
End: 2020-09-08
Payer: MEDICAID

## 2020-09-08 VITALS — DIASTOLIC BLOOD PRESSURE: 84 MMHG | WEIGHT: 196 LBS | SYSTOLIC BLOOD PRESSURE: 148 MMHG | BODY MASS INDEX: 33.64 KG/M2

## 2020-09-08 DIAGNOSIS — O13.3 GESTATIONAL HYPERTENSION, THIRD TRIMESTER: ICD-10-CM

## 2020-09-08 DIAGNOSIS — Z34.03 SUPERVISION OF NORMAL FIRST PREGNANCY IN THIRD TRIMESTER: ICD-10-CM

## 2020-09-08 LAB
APPEARANCE UR: CLEAR
BILIRUB UR STRIP-MCNC: NORMAL MG/DL
COLOR UR AUTO: NORMAL
GLUCOSE UR STRIP.AUTO-MCNC: NEGATIVE MG/DL
KETONES UR STRIP.AUTO-MCNC: NORMAL MG/DL
LEUKOCYTE ESTERASE UR QL STRIP.AUTO: NEGATIVE
NITRITE UR QL STRIP.AUTO: NEGATIVE
PH UR STRIP.AUTO: 6 [PH] (ref 5–8)
PROT UR QL STRIP: 300 MG/DL
RBC UR QL AUTO: NEGATIVE
SP GR UR STRIP.AUTO: 1.03
UROBILINOGEN UR STRIP-MCNC: NORMAL MG/DL

## 2020-09-08 PROCEDURE — 81002 URINALYSIS NONAUTO W/O SCOPE: CPT | Performed by: ADVANCED PRACTICE MIDWIFE

## 2020-09-08 PROCEDURE — 90040 PR PRENATAL FOLLOW UP: CPT | Performed by: ADVANCED PRACTICE MIDWIFE

## 2020-09-08 ASSESSMENT — FIBROSIS 4 INDEX: FIB4 SCORE: 0.37

## 2020-09-08 NOTE — PROGRESS NOTES
Has patient been referred to outside provider?   no    Records available on chart?   n/a    Had physician visit? yes  Indication:  FGR    SUBJECTIVE:  Pt is a 20 y.o.   at 34w0d  gestation. Presents today for follow-up prenatal care. Has not been seen in ER or L & D since last visit. Reports good  fetal movement.     Leaking of fluid?       no    Dysuria?       no    Headaches?      No    N/V?          no    Cramping/contractions?      no    Patient denies poor appetite, RUQ pain, or any other concerns. Has noted that swelling in her feet have continued.     OBJECTIVE:   /84   Wt 88.9 kg (196 lb)   LMP 2020   BMI 33.64 kg/m²   Patients' weight gain, fluid intake and exercise level discussed.  Vitals, fundal height , fetal position, and FHR reviewed on flowsheet    ASSESSMENT/ PLAN:   - IUP at 34w0d    - S = D   -   Patient Active Problem List    Diagnosis Date Noted   • Normal first pregnancy in third trimester 08/10/2020   • COVID-19 virus infection 2020   • Urinary tract infection affecting pregnancy 2020         Gestational hypertension  Discussed in detail with patient new diagnosis of gestational hypertension. Her blood pressures in clinic today are reassuring enough to complete outpatient labs. Details on precautions reviewed with patient including vision changes, nausea/vomitting, RUQ pain, or overall feelings of severe sickness. She is aware that these would necessitate more immediate evaluation at labor and delivery. We discussed that at current she does not have preeclampsia but that high suspicion for this. She will complete labs ASAP and follow up for NST in clinic.  NST today reactive.       - S/sx pregnancy and labor warning signs vs general discomforts discussed  - Fetal movements and kick counts reviewed. Adequate hydration reinforced.  - Did discuss current COVID policies related to outpatient and inpatient visits.   - Anticipatory guidance provided.   - 2x weekly  testing.

## 2020-09-08 NOTE — PROGRESS NOTES
Pt here today for OB follow up  Reports +FM  WT: 196 lb  BP: 148/84  Preferred pharmacy verified with pt.  Pt states no complaints or concerns today  Good # [phone]

## 2020-09-10 ENCOUNTER — HOSPITAL ENCOUNTER (OUTPATIENT)
Dept: LAB | Facility: MEDICAL CENTER | Age: 20
End: 2020-09-10
Attending: ADVANCED PRACTICE MIDWIFE
Payer: MEDICAID

## 2020-09-10 DIAGNOSIS — O13.3 GESTATIONAL HYPERTENSION, THIRD TRIMESTER: ICD-10-CM

## 2020-09-10 LAB
ALBUMIN SERPL BCP-MCNC: 3.4 G/DL (ref 3.2–4.9)
ALBUMIN/GLOB SERPL: 1.1 G/DL
ALP SERPL-CCNC: 222 U/L (ref 30–99)
ALT SERPL-CCNC: 14 U/L (ref 2–50)
ANION GAP SERPL CALC-SCNC: 15 MMOL/L (ref 7–16)
AST SERPL-CCNC: 20 U/L (ref 12–45)
BASOPHILS # BLD AUTO: 0.5 % (ref 0–1.8)
BASOPHILS # BLD: 0.05 K/UL (ref 0–0.12)
BILIRUB SERPL-MCNC: <0.2 MG/DL (ref 0.1–1.5)
BUN SERPL-MCNC: 12 MG/DL (ref 8–22)
CALCIUM SERPL-MCNC: 9 MG/DL (ref 8.4–10.2)
CHLORIDE SERPL-SCNC: 106 MMOL/L (ref 96–112)
CO2 SERPL-SCNC: 18 MMOL/L (ref 20–33)
CREAT SERPL-MCNC: 0.52 MG/DL (ref 0.5–1.4)
EOSINOPHIL # BLD AUTO: 0.13 K/UL (ref 0–0.51)
EOSINOPHIL NFR BLD: 1.3 % (ref 0–6.9)
ERYTHROCYTE [DISTWIDTH] IN BLOOD BY AUTOMATED COUNT: 43.5 FL (ref 35.9–50)
GLOBULIN SER CALC-MCNC: 3.1 G/DL (ref 1.9–3.5)
GLUCOSE SERPL-MCNC: 132 MG/DL (ref 65–99)
HCT VFR BLD AUTO: 37.9 % (ref 37–47)
HGB BLD-MCNC: 12.1 G/DL (ref 12–16)
IMM GRANULOCYTES # BLD AUTO: 0.09 K/UL (ref 0–0.11)
IMM GRANULOCYTES NFR BLD AUTO: 0.9 % (ref 0–0.9)
LDH SERPL L TO P-CCNC: 208 U/L (ref 107–266)
LYMPHOCYTES # BLD AUTO: 2.1 K/UL (ref 1–4.8)
LYMPHOCYTES NFR BLD: 21.6 % (ref 22–41)
MCH RBC QN AUTO: 25.5 PG (ref 27–33)
MCHC RBC AUTO-ENTMCNC: 31.9 G/DL (ref 33.6–35)
MCV RBC AUTO: 80 FL (ref 81.4–97.8)
MONOCYTES # BLD AUTO: 0.62 K/UL (ref 0–0.85)
MONOCYTES NFR BLD AUTO: 6.4 % (ref 0–13.4)
NEUTROPHILS # BLD AUTO: 6.75 K/UL (ref 2–7.15)
NEUTROPHILS NFR BLD: 69.3 % (ref 44–72)
NRBC # BLD AUTO: 0 K/UL
NRBC BLD-RTO: 0 /100 WBC
PLATELET # BLD AUTO: 298 K/UL (ref 164–446)
PMV BLD AUTO: 9.7 FL (ref 9–12.9)
POTASSIUM SERPL-SCNC: 4 MMOL/L (ref 3.6–5.5)
PROT SERPL-MCNC: 6.5 G/DL (ref 6–8.2)
RBC # BLD AUTO: 4.74 M/UL (ref 4.2–5.4)
SODIUM SERPL-SCNC: 139 MMOL/L (ref 135–145)
URATE SERPL-MCNC: 5.2 MG/DL (ref 1.9–8.2)
WBC # BLD AUTO: 9.7 K/UL (ref 4.8–10.8)

## 2020-09-10 PROCEDURE — 85025 COMPLETE CBC W/AUTO DIFF WBC: CPT

## 2020-09-10 PROCEDURE — 80053 COMPREHEN METABOLIC PANEL: CPT

## 2020-09-10 PROCEDURE — 84550 ASSAY OF BLOOD/URIC ACID: CPT

## 2020-09-10 PROCEDURE — 83615 LACTATE (LD) (LDH) ENZYME: CPT

## 2020-09-10 PROCEDURE — 36415 COLL VENOUS BLD VENIPUNCTURE: CPT

## 2020-09-11 ENCOUNTER — HOSPITAL ENCOUNTER (INPATIENT)
Facility: MEDICAL CENTER | Age: 20
LOS: 4 days | End: 2020-09-15
Attending: OBSTETRICS & GYNECOLOGY | Admitting: OBSTETRICS & GYNECOLOGY
Payer: MEDICAID

## 2020-09-11 ENCOUNTER — ROUTINE PRENATAL (OUTPATIENT)
Dept: OBGYN | Facility: CLINIC | Age: 20
End: 2020-09-11
Payer: MEDICAID

## 2020-09-11 ENCOUNTER — APPOINTMENT (OUTPATIENT)
Dept: RADIOLOGY | Facility: MEDICAL CENTER | Age: 20
End: 2020-09-11
Attending: OBSTETRICS & GYNECOLOGY
Payer: MEDICAID

## 2020-09-11 ENCOUNTER — ANESTHESIA EVENT (OUTPATIENT)
Dept: OBGYN | Facility: MEDICAL CENTER | Age: 20
End: 2020-09-11
Payer: MEDICAID

## 2020-09-11 ENCOUNTER — ANESTHESIA (OUTPATIENT)
Dept: OBGYN | Facility: MEDICAL CENTER | Age: 20
End: 2020-09-11
Payer: MEDICAID

## 2020-09-11 ENCOUNTER — NON-PROVIDER VISIT (OUTPATIENT)
Dept: OBGYN | Facility: CLINIC | Age: 20
End: 2020-09-11
Payer: MEDICAID

## 2020-09-11 VITALS — DIASTOLIC BLOOD PRESSURE: 82 MMHG | SYSTOLIC BLOOD PRESSURE: 143 MMHG | WEIGHT: 197 LBS | BODY MASS INDEX: 33.81 KG/M2

## 2020-09-11 DIAGNOSIS — O10.919 CHRONIC HYPERTENSION AFFECTING PREGNANCY: ICD-10-CM

## 2020-09-11 DIAGNOSIS — Z98.891 S/P C-SECTION: ICD-10-CM

## 2020-09-11 LAB
ALBUMIN SERPL BCP-MCNC: 3.3 G/DL (ref 3.2–4.9)
ALBUMIN/GLOB SERPL: 1.2 G/DL
ALP SERPL-CCNC: 214 U/L (ref 30–99)
ALT SERPL-CCNC: 13 U/L (ref 2–50)
AMPHET UR QL SCN: NEGATIVE
ANION GAP SERPL CALC-SCNC: 14 MMOL/L (ref 7–16)
APPEARANCE UR: CLEAR
AST SERPL-CCNC: 20 U/L (ref 12–45)
BARBITURATES UR QL SCN: NEGATIVE
BASOPHILS # BLD AUTO: 0.4 % (ref 0–1.8)
BASOPHILS # BLD: 0.04 K/UL (ref 0–0.12)
BENZODIAZ UR QL SCN: NEGATIVE
BILIRUB SERPL-MCNC: <0.2 MG/DL (ref 0.1–1.5)
BUN SERPL-MCNC: 12 MG/DL (ref 8–22)
BZE UR QL SCN: NEGATIVE
CALCIUM SERPL-MCNC: 8.9 MG/DL (ref 8.5–10.5)
CANNABINOIDS UR QL SCN: NEGATIVE
CHLORIDE SERPL-SCNC: 103 MMOL/L (ref 96–112)
CO2 SERPL-SCNC: 20 MMOL/L (ref 20–33)
COLOR UR AUTO: YELLOW
COVID ORDER STATUS COVID19: NORMAL
CREAT SERPL-MCNC: 0.46 MG/DL (ref 0.5–1.4)
CREAT UR-MCNC: 35.08 MG/DL
EOSINOPHIL # BLD AUTO: 0.25 K/UL (ref 0–0.51)
EOSINOPHIL NFR BLD: 2.8 % (ref 0–6.9)
ERYTHROCYTE [DISTWIDTH] IN BLOOD BY AUTOMATED COUNT: 44 FL (ref 35.9–50)
GLOBULIN SER CALC-MCNC: 2.7 G/DL (ref 1.9–3.5)
GLUCOSE SERPL-MCNC: 75 MG/DL (ref 65–99)
GLUCOSE UR QL STRIP.AUTO: NEGATIVE MG/DL
GP B STREP DNA SPEC QL NAA+PROBE: POSITIVE
HCT VFR BLD AUTO: 37.3 % (ref 37–47)
HGB BLD-MCNC: 11.9 G/DL (ref 12–16)
HOLDING TUBE BB 8507: NORMAL
IMM GRANULOCYTES # BLD AUTO: 0.07 K/UL (ref 0–0.11)
IMM GRANULOCYTES NFR BLD AUTO: 0.8 % (ref 0–0.9)
KETONES UR QL STRIP.AUTO: NEGATIVE MG/DL
LEUKOCYTE ESTERASE UR QL STRIP.AUTO: ABNORMAL
LYMPHOCYTES # BLD AUTO: 2.28 K/UL (ref 1–4.8)
LYMPHOCYTES NFR BLD: 25.4 % (ref 22–41)
MCH RBC QN AUTO: 26.2 PG (ref 27–33)
MCHC RBC AUTO-ENTMCNC: 31.9 G/DL (ref 33.6–35)
MCV RBC AUTO: 82 FL (ref 81.4–97.8)
METHADONE UR QL SCN: NEGATIVE
MONOCYTES # BLD AUTO: 0.84 K/UL (ref 0–0.85)
MONOCYTES NFR BLD AUTO: 9.3 % (ref 0–13.4)
NEUTROPHILS # BLD AUTO: 5.51 K/UL (ref 2–7.15)
NEUTROPHILS NFR BLD: 61.3 % (ref 44–72)
NITRITE UR QL STRIP.AUTO: NEGATIVE
NRBC # BLD AUTO: 0 K/UL
NRBC BLD-RTO: 0 /100 WBC
NST ACOUSTIC STIMULATION: ABNORMAL
NST ACTION NECESSARY: ABNORMAL
NST ASSESSMENT: ABNORMAL
NST BASELINE: ABNORMAL
NST INDICATIONS: ABNORMAL
NST OTHER DATA: ABNORMAL
NST READ BY: ABNORMAL
NST RETURN: ABNORMAL
NST UTERINE ACTIVITY: ABNORMAL
OPIATES UR QL SCN: NEGATIVE
OXYCODONE UR QL SCN: NEGATIVE
PCP UR QL SCN: NEGATIVE
PH UR STRIP.AUTO: 7 [PH] (ref 5–8)
PLATELET # BLD AUTO: 275 K/UL (ref 164–446)
PMV BLD AUTO: 9.8 FL (ref 9–12.9)
POTASSIUM SERPL-SCNC: 4.2 MMOL/L (ref 3.6–5.5)
PROPOXYPH UR QL SCN: NEGATIVE
PROT SERPL-MCNC: 6 G/DL (ref 6–8.2)
PROT UR QL STRIP: 100 MG/DL
PROT UR-MCNC: 68 MG/DL (ref 0–15)
PROT/CREAT UR: 1938 MG/G (ref 10–107)
RBC # BLD AUTO: 4.55 M/UL (ref 4.2–5.4)
RBC UR QL AUTO: NEGATIVE
SARS-COV+SARS-COV-2 AG RESP QL IA.RAPID: NOTDETECTED
SODIUM SERPL-SCNC: 137 MMOL/L (ref 135–145)
SP GR UR STRIP.AUTO: 1.01 (ref 1–1.03)
SPECIMEN SOURCE: NORMAL
URATE SERPL-MCNC: 5.2 MG/DL (ref 1.9–8.2)
WBC # BLD AUTO: 9 K/UL (ref 4.8–10.8)

## 2020-09-11 PROCEDURE — 700111 HCHG RX REV CODE 636 W/ 250 OVERRIDE (IP): Performed by: ANESTHESIOLOGY

## 2020-09-11 PROCEDURE — 770002 HCHG ROOM/CARE - OB PRIVATE (112)

## 2020-09-11 PROCEDURE — 160035 HCHG PACU - 1ST 60 MINS PHASE I: Performed by: OBSTETRICS & GYNECOLOGY

## 2020-09-11 PROCEDURE — 85025 COMPLETE CBC W/AUTO DIFF WBC: CPT

## 2020-09-11 PROCEDURE — 700105 HCHG RX REV CODE 258: Performed by: ANESTHESIOLOGY

## 2020-09-11 PROCEDURE — 501445 HCHG STAPLER, SKIN DISP: Performed by: OBSTETRICS & GYNECOLOGY

## 2020-09-11 PROCEDURE — 700101 HCHG RX REV CODE 250: Performed by: ANESTHESIOLOGY

## 2020-09-11 PROCEDURE — 88307 TISSUE EXAM BY PATHOLOGIST: CPT

## 2020-09-11 PROCEDURE — 160041 HCHG SURGERY MINUTES - EA ADDL 1 MIN LEVEL 4: Performed by: OBSTETRICS & GYNECOLOGY

## 2020-09-11 PROCEDURE — 87426 SARSCOV CORONAVIRUS AG IA: CPT

## 2020-09-11 PROCEDURE — 84550 ASSAY OF BLOOD/URIC ACID: CPT

## 2020-09-11 PROCEDURE — 700105 HCHG RX REV CODE 258: Performed by: OBSTETRICS & GYNECOLOGY

## 2020-09-11 PROCEDURE — 87653 STREP B DNA AMP PROBE: CPT

## 2020-09-11 PROCEDURE — 160009 HCHG ANES TIME/MIN: Performed by: OBSTETRICS & GYNECOLOGY

## 2020-09-11 PROCEDURE — C9803 HOPD COVID-19 SPEC COLLECT: HCPCS | Performed by: OBSTETRICS & GYNECOLOGY

## 2020-09-11 PROCEDURE — 160048 HCHG OR STATISTICAL LEVEL 1-5: Performed by: OBSTETRICS & GYNECOLOGY

## 2020-09-11 PROCEDURE — 59510 CESAREAN DELIVERY: CPT | Performed by: OBSTETRICS & GYNECOLOGY

## 2020-09-11 PROCEDURE — 700111 HCHG RX REV CODE 636 W/ 250 OVERRIDE (IP)

## 2020-09-11 PROCEDURE — A9270 NON-COVERED ITEM OR SERVICE: HCPCS | Performed by: OBSTETRICS & GYNECOLOGY

## 2020-09-11 PROCEDURE — 81002 URINALYSIS NONAUTO W/O SCOPE: CPT

## 2020-09-11 PROCEDURE — 160002 HCHG RECOVERY MINUTES (STAT): Performed by: OBSTETRICS & GYNECOLOGY

## 2020-09-11 PROCEDURE — 90040 PR PRENATAL FOLLOW UP: CPT | Performed by: OBSTETRICS & GYNECOLOGY

## 2020-09-11 PROCEDURE — 160029 HCHG SURGERY MINUTES - 1ST 30 MINS LEVEL 4: Performed by: OBSTETRICS & GYNECOLOGY

## 2020-09-11 PROCEDURE — 59514 CESAREAN DELIVERY ONLY: CPT | Mod: AS | Performed by: NURSE PRACTITIONER

## 2020-09-11 PROCEDURE — 700102 HCHG RX REV CODE 250 W/ 637 OVERRIDE(OP): Performed by: OBSTETRICS & GYNECOLOGY

## 2020-09-11 PROCEDURE — 80307 DRUG TEST PRSMV CHEM ANLYZR: CPT

## 2020-09-11 PROCEDURE — 700111 HCHG RX REV CODE 636 W/ 250 OVERRIDE (IP): Performed by: OBSTETRICS & GYNECOLOGY

## 2020-09-11 PROCEDURE — 80053 COMPREHEN METABOLIC PANEL: CPT

## 2020-09-11 PROCEDURE — 84156 ASSAY OF PROTEIN URINE: CPT

## 2020-09-11 PROCEDURE — 82570 ASSAY OF URINE CREATININE: CPT

## 2020-09-11 PROCEDURE — 76819 FETAL BIOPHYS PROFIL W/O NST: CPT

## 2020-09-11 RX ORDER — SODIUM CHLORIDE, SODIUM LACTATE, POTASSIUM CHLORIDE, CALCIUM CHLORIDE 600; 310; 30; 20 MG/100ML; MG/100ML; MG/100ML; MG/100ML
1000 INJECTION, SOLUTION INTRAVENOUS ONCE
Status: COMPLETED | OUTPATIENT
Start: 2020-09-11 | End: 2020-09-11

## 2020-09-11 RX ORDER — BETAMETHASONE SODIUM PHOSPHATE AND BETAMETHASONE ACETATE 3; 3 MG/ML; MG/ML
12 INJECTION, SUSPENSION INTRA-ARTICULAR; INTRALESIONAL; INTRAMUSCULAR; SOFT TISSUE EVERY 24 HOURS
Status: DISCONTINUED | OUTPATIENT
Start: 2020-09-11 | End: 2020-09-12

## 2020-09-11 RX ORDER — CEFAZOLIN SODIUM 1 G/3ML
INJECTION, POWDER, FOR SOLUTION INTRAMUSCULAR; INTRAVENOUS PRN
Status: DISCONTINUED | OUTPATIENT
Start: 2020-09-11 | End: 2020-09-11 | Stop reason: SURG

## 2020-09-11 RX ORDER — OXYTOCIN 10 [USP'U]/ML
INJECTION, SOLUTION INTRAMUSCULAR; INTRAVENOUS
Status: COMPLETED
Start: 2020-09-11 | End: 2020-09-11

## 2020-09-11 RX ORDER — ONDANSETRON 2 MG/ML
INJECTION INTRAMUSCULAR; INTRAVENOUS PRN
Status: DISCONTINUED | OUTPATIENT
Start: 2020-09-11 | End: 2020-09-11 | Stop reason: SURG

## 2020-09-11 RX ORDER — ONDANSETRON 4 MG/1
4 TABLET, ORALLY DISINTEGRATING ORAL EVERY 6 HOURS PRN
Status: CANCELLED | OUTPATIENT
Start: 2020-09-11

## 2020-09-11 RX ORDER — SODIUM CHLORIDE, SODIUM LACTATE, POTASSIUM CHLORIDE, CALCIUM CHLORIDE 600; 310; 30; 20 MG/100ML; MG/100ML; MG/100ML; MG/100ML
INJECTION, SOLUTION INTRAVENOUS
Status: ACTIVE
Start: 2020-09-11 | End: 2020-09-12

## 2020-09-11 RX ORDER — ONDANSETRON 2 MG/ML
4 INJECTION INTRAMUSCULAR; INTRAVENOUS EVERY 6 HOURS PRN
Status: CANCELLED | OUTPATIENT
Start: 2020-09-11

## 2020-09-11 RX ORDER — BUPIVACAINE HYDROCHLORIDE 7.5 MG/ML
INJECTION, SOLUTION INTRASPINAL
Status: COMPLETED | OUTPATIENT
Start: 2020-09-11 | End: 2020-09-11

## 2020-09-11 RX ORDER — SODIUM CHLORIDE, SODIUM LACTATE, POTASSIUM CHLORIDE, CALCIUM CHLORIDE 600; 310; 30; 20 MG/100ML; MG/100ML; MG/100ML; MG/100ML
INJECTION, SOLUTION INTRAVENOUS CONTINUOUS
Status: DISCONTINUED | OUTPATIENT
Start: 2020-09-11 | End: 2020-09-15 | Stop reason: HOSPADM

## 2020-09-11 RX ORDER — SODIUM CHLORIDE, SODIUM GLUCONATE, SODIUM ACETATE, POTASSIUM CHLORIDE AND MAGNESIUM CHLORIDE 526; 502; 368; 37; 30 MG/100ML; MG/100ML; MG/100ML; MG/100ML; MG/100ML
1500 INJECTION, SOLUTION INTRAVENOUS ONCE
Status: COMPLETED | OUTPATIENT
Start: 2020-09-11 | End: 2020-09-11

## 2020-09-11 RX ORDER — HYDRALAZINE HYDROCHLORIDE 20 MG/ML
5-10 INJECTION INTRAMUSCULAR; INTRAVENOUS PRN
Status: DISCONTINUED | OUTPATIENT
Start: 2020-09-11 | End: 2020-09-12

## 2020-09-11 RX ORDER — SODIUM CHLORIDE, SODIUM LACTATE, POTASSIUM CHLORIDE, CALCIUM CHLORIDE 600; 310; 30; 20 MG/100ML; MG/100ML; MG/100ML; MG/100ML
INJECTION, SOLUTION INTRAVENOUS CONTINUOUS
Status: ACTIVE | OUTPATIENT
Start: 2020-09-11 | End: 2020-09-12

## 2020-09-11 RX ORDER — AZITHROMYCIN 500 MG/5ML
500 INJECTION, POWDER, LYOPHILIZED, FOR SOLUTION INTRAVENOUS EVERY 24 HOURS
Status: DISCONTINUED | OUTPATIENT
Start: 2020-09-11 | End: 2020-09-12

## 2020-09-11 RX ORDER — AZITHROMYCIN 500 MG/5ML
500 INJECTION, POWDER, LYOPHILIZED, FOR SOLUTION INTRAVENOUS EVERY 24 HOURS
Status: DISCONTINUED | OUTPATIENT
Start: 2020-09-12 | End: 2020-09-11

## 2020-09-11 RX ORDER — MORPHINE SULFATE 0.5 MG/ML
INJECTION, SOLUTION EPIDURAL; INTRATHECAL; INTRAVENOUS
Status: COMPLETED | OUTPATIENT
Start: 2020-09-11 | End: 2020-09-11

## 2020-09-11 RX ORDER — LABETALOL HYDROCHLORIDE 5 MG/ML
20-80 INJECTION, SOLUTION INTRAVENOUS PRN
Status: DISCONTINUED | OUTPATIENT
Start: 2020-09-11 | End: 2020-09-12

## 2020-09-11 RX ORDER — SODIUM CHLORIDE, SODIUM LACTATE, POTASSIUM CHLORIDE, CALCIUM CHLORIDE 600; 310; 30; 20 MG/100ML; MG/100ML; MG/100ML; MG/100ML
INJECTION, SOLUTION INTRAVENOUS CONTINUOUS
Status: DISCONTINUED | OUTPATIENT
Start: 2020-09-12 | End: 2020-09-15 | Stop reason: HOSPADM

## 2020-09-11 RX ORDER — CITRIC ACID/SODIUM CITRATE 334-500MG
30 SOLUTION, ORAL ORAL ONCE
Status: COMPLETED | OUTPATIENT
Start: 2020-09-11 | End: 2020-09-11

## 2020-09-11 RX ORDER — METOCLOPRAMIDE HYDROCHLORIDE 5 MG/ML
10 INJECTION INTRAMUSCULAR; INTRAVENOUS ONCE
Status: COMPLETED | OUTPATIENT
Start: 2020-09-11 | End: 2020-09-11

## 2020-09-11 RX ORDER — MEPERIDINE HYDROCHLORIDE 25 MG/ML
6.25 INJECTION INTRAMUSCULAR; INTRAVENOUS; SUBCUTANEOUS
Status: DISCONTINUED | OUTPATIENT
Start: 2020-09-11 | End: 2020-09-12 | Stop reason: HOSPADM

## 2020-09-11 RX ORDER — PHENYLEPHRINE HCL IN 0.9% NACL 0.5 MG/5ML
SYRINGE (ML) INTRAVENOUS PRN
Status: DISCONTINUED | OUTPATIENT
Start: 2020-09-11 | End: 2020-09-11 | Stop reason: SURG

## 2020-09-11 RX ORDER — KETOROLAC TROMETHAMINE 30 MG/ML
INJECTION, SOLUTION INTRAMUSCULAR; INTRAVENOUS PRN
Status: DISCONTINUED | OUTPATIENT
Start: 2020-09-11 | End: 2020-09-11 | Stop reason: SURG

## 2020-09-11 RX ORDER — SODIUM CHLORIDE, SODIUM GLUCONATE, SODIUM ACETATE, POTASSIUM CHLORIDE AND MAGNESIUM CHLORIDE 526; 502; 368; 37; 30 MG/100ML; MG/100ML; MG/100ML; MG/100ML; MG/100ML
INJECTION, SOLUTION INTRAVENOUS
Status: DISCONTINUED | OUTPATIENT
Start: 2020-09-11 | End: 2020-09-11 | Stop reason: SURG

## 2020-09-11 RX ADMIN — FENTANYL CITRATE 15 MCG: 50 INJECTION INTRAMUSCULAR; INTRAVENOUS at 22:18

## 2020-09-11 RX ADMIN — SODIUM CHLORIDE, SODIUM GLUCONATE, SODIUM ACETATE, POTASSIUM CHLORIDE AND MAGNESIUM CHLORIDE: 526; 502; 368; 37; 30 INJECTION, SOLUTION INTRAVENOUS at 22:13

## 2020-09-11 RX ADMIN — BUPIVACAINE HYDROCHLORIDE IN DEXTROSE 1.6 ML: 7.5 INJECTION, SOLUTION SUBARACHNOID at 22:18

## 2020-09-11 RX ADMIN — Medication 100 MCG: at 22:24

## 2020-09-11 RX ADMIN — OXYTOCIN 125 ML/HR: 10 INJECTION, SOLUTION INTRAMUSCULAR; INTRAVENOUS at 23:58

## 2020-09-11 RX ADMIN — METOCLOPRAMIDE 10 MG: 5 INJECTION, SOLUTION INTRAMUSCULAR; INTRAVENOUS at 21:58

## 2020-09-11 RX ADMIN — SODIUM CHLORIDE, POTASSIUM CHLORIDE, SODIUM LACTATE AND CALCIUM CHLORIDE: 600; 310; 30; 20 INJECTION, SOLUTION INTRAVENOUS at 19:04

## 2020-09-11 RX ADMIN — FAMOTIDINE 20 MG: 10 INJECTION, SOLUTION INTRAVENOUS at 21:58

## 2020-09-11 RX ADMIN — ONDANSETRON 4 MG: 2 INJECTION INTRAMUSCULAR; INTRAVENOUS at 22:24

## 2020-09-11 RX ADMIN — OXYTOCIN: 10 INJECTION, SOLUTION INTRAMUSCULAR; INTRAVENOUS at 22:44

## 2020-09-11 RX ADMIN — OXYTOCIN 1000 ML: 10 INJECTION, SOLUTION INTRAMUSCULAR; INTRAVENOUS at 22:45

## 2020-09-11 RX ADMIN — AZITHROMYCIN FOR INJECTION INJECTION, POWDER, LYOPHILIZED, FOR SOLUTION 500 MG: 500 INJECTION INTRAVENOUS at 21:44

## 2020-09-11 RX ADMIN — BETAMETHASONE SODIUM PHOSPHATE AND BETAMETHASONE ACETATE 12 MG: 3; 3 INJECTION, SUSPENSION INTRA-ARTICULAR; INTRALESIONAL; INTRAMUSCULAR at 18:27

## 2020-09-11 RX ADMIN — SODIUM CITRATE AND CITRIC ACID MONOHYDRATE 30 ML: 500; 334 SOLUTION ORAL at 21:58

## 2020-09-11 RX ADMIN — SODIUM CHLORIDE, SODIUM GLUCONATE, SODIUM ACETATE, POTASSIUM CHLORIDE AND MAGNESIUM CHLORIDE 1000 ML: 526; 502; 368; 37; 30 INJECTION, SOLUTION INTRAVENOUS at 21:45

## 2020-09-11 RX ADMIN — OXYTOCIN 2000 ML/HR: 10 INJECTION, SOLUTION INTRAMUSCULAR; INTRAVENOUS at 22:44

## 2020-09-11 RX ADMIN — KETOROLAC TROMETHAMINE 30 MG: 30 INJECTION, SOLUTION INTRAMUSCULAR at 23:20

## 2020-09-11 RX ADMIN — CEFAZOLIN 2 G: 330 INJECTION, POWDER, FOR SOLUTION INTRAMUSCULAR; INTRAVENOUS at 22:25

## 2020-09-11 RX ADMIN — MORPHINE SULFATE 100 MCG: 0.5 INJECTION, SOLUTION EPIDURAL; INTRATHECAL; INTRAVENOUS at 22:18

## 2020-09-11 RX ADMIN — SODIUM CHLORIDE, POTASSIUM CHLORIDE, SODIUM LACTATE AND CALCIUM CHLORIDE 1000 ML: 600; 310; 30; 20 INJECTION, SOLUTION INTRAVENOUS at 18:10

## 2020-09-11 SDOH — ECONOMIC STABILITY: FOOD INSECURITY: WITHIN THE PAST 12 MONTHS, THE FOOD YOU BOUGHT JUST DIDN'T LAST AND YOU DIDN'T HAVE MONEY TO GET MORE.: PATIENT DECLINED

## 2020-09-11 SDOH — ECONOMIC STABILITY: TRANSPORTATION INSECURITY
IN THE PAST 12 MONTHS, HAS LACK OF TRANSPORTATION KEPT YOU FROM MEETINGS, WORK, OR FROM GETTING THINGS NEEDED FOR DAILY LIVING?: PATIENT DECLINED

## 2020-09-11 SDOH — ECONOMIC STABILITY: FOOD INSECURITY: WITHIN THE PAST 12 MONTHS, YOU WORRIED THAT YOUR FOOD WOULD RUN OUT BEFORE YOU GOT MONEY TO BUY MORE.: PATIENT DECLINED

## 2020-09-11 SDOH — ECONOMIC STABILITY: TRANSPORTATION INSECURITY
IN THE PAST 12 MONTHS, HAS THE LACK OF TRANSPORTATION KEPT YOU FROM MEDICAL APPOINTMENTS OR FROM GETTING MEDICATIONS?: PATIENT DECLINED

## 2020-09-11 ASSESSMENT — FIBROSIS 4 INDEX
FIB4 SCORE: 0.42
FIB4 SCORE: 0.38

## 2020-09-11 ASSESSMENT — PATIENT HEALTH QUESTIONNAIRE - PHQ9
1. LITTLE INTEREST OR PLEASURE IN DOING THINGS: NOT AT ALL
2. FEELING DOWN, DEPRESSED, IRRITABLE, OR HOPELESS: NOT AT ALL
SUM OF ALL RESPONSES TO PHQ9 QUESTIONS 1 AND 2: 0

## 2020-09-11 ASSESSMENT — LIFESTYLE VARIABLES: EVER_SMOKED: NEVER

## 2020-09-11 NOTE — PROGRESS NOTES
OB follow up   + fetal movement. Active  No VB, LOF or UC's.  Wt:197lbs       BP:143/82  Phone # [phone]  Preferred pharmacy confirmed.  C/o  Cramping here and there

## 2020-09-11 NOTE — PROGRESS NOTES
BPs 130-140s    NST with spontaneous deceleration to 80s for 2 minutes.  The remainder of the NST shows fetal heart tones in the 140s to 150s, with moderate variability, accelerations present, but not meeting criteria for reactivity.    Patient instructed to proceed to labor and delivery for further monitoring, BPP, serial blood pressures with PIH labs.

## 2020-09-12 LAB
ERYTHROCYTE [DISTWIDTH] IN BLOOD BY AUTOMATED COUNT: 44.4 FL (ref 35.9–50)
HCT VFR BLD AUTO: 33.8 % (ref 37–47)
HGB BLD-MCNC: 10.5 G/DL (ref 12–16)
MCH RBC QN AUTO: 25.7 PG (ref 27–33)
MCHC RBC AUTO-ENTMCNC: 31.1 G/DL (ref 33.6–35)
MCV RBC AUTO: 82.8 FL (ref 81.4–97.8)
PLATELET # BLD AUTO: 284 K/UL (ref 164–446)
PMV BLD AUTO: 10 FL (ref 9–12.9)
RBC # BLD AUTO: 4.08 M/UL (ref 4.2–5.4)
WBC # BLD AUTO: 15.2 K/UL (ref 4.8–10.8)

## 2020-09-12 PROCEDURE — 700102 HCHG RX REV CODE 250 W/ 637 OVERRIDE(OP): Performed by: OBSTETRICS & GYNECOLOGY

## 2020-09-12 PROCEDURE — 36415 COLL VENOUS BLD VENIPUNCTURE: CPT

## 2020-09-12 PROCEDURE — 700111 HCHG RX REV CODE 636 W/ 250 OVERRIDE (IP): Performed by: ANESTHESIOLOGY

## 2020-09-12 PROCEDURE — 700102 HCHG RX REV CODE 250 W/ 637 OVERRIDE(OP): Performed by: ANESTHESIOLOGY

## 2020-09-12 PROCEDURE — 770002 HCHG ROOM/CARE - OB PRIVATE (112)

## 2020-09-12 PROCEDURE — 85027 COMPLETE CBC AUTOMATED: CPT

## 2020-09-12 PROCEDURE — A9270 NON-COVERED ITEM OR SERVICE: HCPCS | Performed by: ANESTHESIOLOGY

## 2020-09-12 PROCEDURE — A9270 NON-COVERED ITEM OR SERVICE: HCPCS | Performed by: OBSTETRICS & GYNECOLOGY

## 2020-09-12 RX ORDER — HYDROCODONE BITARTRATE AND ACETAMINOPHEN 5; 325 MG/1; MG/1
2 TABLET ORAL EVERY 4 HOURS PRN
Status: DISCONTINUED | OUTPATIENT
Start: 2020-09-12 | End: 2020-09-15 | Stop reason: HOSPADM

## 2020-09-12 RX ORDER — CARBOPROST TROMETHAMINE 250 UG/ML
250 INJECTION, SOLUTION INTRAMUSCULAR
Status: DISCONTINUED | OUTPATIENT
Start: 2020-09-12 | End: 2020-09-15 | Stop reason: HOSPADM

## 2020-09-12 RX ORDER — SIMETHICONE 80 MG
80 TABLET,CHEWABLE ORAL 4 TIMES DAILY PRN
Status: DISCONTINUED | OUTPATIENT
Start: 2020-09-12 | End: 2020-09-15 | Stop reason: HOSPADM

## 2020-09-12 RX ORDER — OXYCODONE HYDROCHLORIDE 5 MG/1
5 TABLET ORAL EVERY 4 HOURS PRN
Status: DISCONTINUED | OUTPATIENT
Start: 2020-09-12 | End: 2020-09-12

## 2020-09-12 RX ORDER — MISOPROSTOL 200 UG/1
600 TABLET ORAL
Status: DISCONTINUED | OUTPATIENT
Start: 2020-09-12 | End: 2020-09-15 | Stop reason: HOSPADM

## 2020-09-12 RX ORDER — VITAMIN A ACETATE, BETA CAROTENE, ASCORBIC ACID, CHOLECALCIFEROL, .ALPHA.-TOCOPHEROL ACETATE, DL-, THIAMINE MONONITRATE, RIBOFLAVIN, NIACINAMIDE, PYRIDOXINE HYDROCHLORIDE, FOLIC ACID, CYANOCOBALAMIN, CALCIUM CARBONATE, FERROUS FUMARATE, ZINC OXIDE, CUPRIC OXIDE 3080; 12; 120; 400; 1; 1.84; 3; 20; 22; 920; 25; 200; 27; 10; 2 [IU]/1; UG/1; MG/1; [IU]/1; MG/1; MG/1; MG/1; MG/1; MG/1; [IU]/1; MG/1; MG/1; MG/1; MG/1; MG/1
1 TABLET, FILM COATED ORAL
Status: DISCONTINUED | OUTPATIENT
Start: 2020-09-12 | End: 2020-09-15 | Stop reason: HOSPADM

## 2020-09-12 RX ORDER — OXYCODONE HYDROCHLORIDE 10 MG/1
10 TABLET ORAL EVERY 4 HOURS PRN
Status: DISCONTINUED | OUTPATIENT
Start: 2020-09-12 | End: 2020-09-12

## 2020-09-12 RX ORDER — METOCLOPRAMIDE HYDROCHLORIDE 5 MG/ML
10 INJECTION INTRAMUSCULAR; INTRAVENOUS EVERY 6 HOURS PRN
Status: DISCONTINUED | OUTPATIENT
Start: 2020-09-12 | End: 2020-09-15 | Stop reason: HOSPADM

## 2020-09-12 RX ORDER — SODIUM CHLORIDE, SODIUM LACTATE, POTASSIUM CHLORIDE, CALCIUM CHLORIDE 600; 310; 30; 20 MG/100ML; MG/100ML; MG/100ML; MG/100ML
INJECTION, SOLUTION INTRAVENOUS PRN
Status: DISCONTINUED | OUTPATIENT
Start: 2020-09-12 | End: 2020-09-15 | Stop reason: HOSPADM

## 2020-09-12 RX ORDER — HYDROMORPHONE HYDROCHLORIDE 1 MG/ML
0.2 INJECTION, SOLUTION INTRAMUSCULAR; INTRAVENOUS; SUBCUTANEOUS
Status: DISCONTINUED | OUTPATIENT
Start: 2020-09-12 | End: 2020-09-12

## 2020-09-12 RX ORDER — HYDROCODONE BITARTRATE AND ACETAMINOPHEN 5; 325 MG/1; MG/1
1 TABLET ORAL EVERY 4 HOURS PRN
Status: DISCONTINUED | OUTPATIENT
Start: 2020-09-12 | End: 2020-09-15 | Stop reason: HOSPADM

## 2020-09-12 RX ORDER — DIPHENHYDRAMINE HYDROCHLORIDE 50 MG/ML
12.5 INJECTION INTRAMUSCULAR; INTRAVENOUS EVERY 6 HOURS PRN
Status: DISCONTINUED | OUTPATIENT
Start: 2020-09-12 | End: 2020-09-12

## 2020-09-12 RX ORDER — HYDROMORPHONE HYDROCHLORIDE 1 MG/ML
0.4 INJECTION, SOLUTION INTRAMUSCULAR; INTRAVENOUS; SUBCUTANEOUS
Status: DISCONTINUED | OUTPATIENT
Start: 2020-09-12 | End: 2020-09-12

## 2020-09-12 RX ORDER — ACETAMINOPHEN 500 MG
1000 TABLET ORAL EVERY 6 HOURS
Status: COMPLETED | OUTPATIENT
Start: 2020-09-12 | End: 2020-09-12

## 2020-09-12 RX ORDER — KETOROLAC TROMETHAMINE 30 MG/ML
30 INJECTION, SOLUTION INTRAMUSCULAR; INTRAVENOUS EVERY 6 HOURS
Status: DISPENSED | OUTPATIENT
Start: 2020-09-12 | End: 2020-09-13

## 2020-09-12 RX ORDER — IBUPROFEN 600 MG/1
600 TABLET ORAL EVERY 6 HOURS PRN
Status: DISCONTINUED | OUTPATIENT
Start: 2020-09-12 | End: 2020-09-15 | Stop reason: HOSPADM

## 2020-09-12 RX ORDER — DIPHENHYDRAMINE HYDROCHLORIDE 50 MG/ML
25 INJECTION INTRAMUSCULAR; INTRAVENOUS EVERY 6 HOURS PRN
Status: DISCONTINUED | OUTPATIENT
Start: 2020-09-12 | End: 2020-09-12

## 2020-09-12 RX ORDER — DOCUSATE SODIUM 100 MG/1
100 CAPSULE, LIQUID FILLED ORAL 2 TIMES DAILY PRN
Status: DISCONTINUED | OUTPATIENT
Start: 2020-09-12 | End: 2020-09-15 | Stop reason: HOSPADM

## 2020-09-12 RX ORDER — ONDANSETRON 2 MG/ML
4 INJECTION INTRAMUSCULAR; INTRAVENOUS EVERY 6 HOURS PRN
Status: DISCONTINUED | OUTPATIENT
Start: 2020-09-12 | End: 2020-09-12

## 2020-09-12 RX ADMIN — ACETAMINOPHEN 1000 MG: 500 TABLET ORAL at 08:21

## 2020-09-12 RX ADMIN — KETOROLAC TROMETHAMINE 30 MG: 30 INJECTION, SOLUTION INTRAMUSCULAR at 15:14

## 2020-09-12 RX ADMIN — PRENATAL WITH FERROUS FUM AND FOLIC ACID 1 TABLET: 3080; 920; 120; 400; 22; 1.84; 3; 20; 10; 1; 12; 200; 27; 25; 2 TABLET ORAL at 08:22

## 2020-09-12 RX ADMIN — ACETAMINOPHEN 1000 MG: 500 TABLET ORAL at 21:03

## 2020-09-12 RX ADMIN — ACETAMINOPHEN 1000 MG: 500 TABLET ORAL at 01:39

## 2020-09-12 RX ADMIN — ACETAMINOPHEN 1000 MG: 500 TABLET ORAL at 15:13

## 2020-09-12 RX ADMIN — KETOROLAC TROMETHAMINE 30 MG: 30 INJECTION, SOLUTION INTRAMUSCULAR at 21:03

## 2020-09-12 ASSESSMENT — EDINBURGH POSTNATAL DEPRESSION SCALE (EPDS)
I HAVE FELT SAD OR MISERABLE: NO, NOT AT ALL
I HAVE BLAMED MYSELF UNNECESSARILY WHEN THINGS WENT WRONG: NO, NEVER
THE THOUGHT OF HARMING MYSELF HAS OCCURRED TO ME: NEVER
I HAVE BEEN SO UNHAPPY THAT I HAVE HAD DIFFICULTY SLEEPING: NOT AT ALL
I HAVE BEEN SO UNHAPPY THAT I HAVE BEEN CRYING: NO, NEVER
I HAVE FELT SCARED OR PANICKY FOR NO GOOD REASON: NO, NOT AT ALL
THINGS HAVE BEEN GETTING ON TOP OF ME: NO, I HAVE BEEN COPING AS WELL AS EVER
I HAVE BEEN ANXIOUS OR WORRIED FOR NO GOOD REASON: NO, NOT AT ALL
I HAVE LOOKED FORWARD WITH ENJOYMENT TO THINGS: AS MUCH AS I EVER DID
I HAVE BEEN ABLE TO LAUGH AND SEE THE FUNNY SIDE OF THINGS: AS MUCH AS I ALWAYS COULD

## 2020-09-12 ASSESSMENT — PAIN SCALES - GENERAL: PAIN_LEVEL: 3

## 2020-09-12 ASSESSMENT — PAIN DESCRIPTION - PAIN TYPE: TYPE: SURGICAL PAIN

## 2020-09-12 NOTE — ANESTHESIA TIME REPORT
Anesthesia Start and Stop Event Times     Date Time Event    9/11/2020 2211 Ready for Procedure     2212 Anesthesia Start     2319 Anesthesia Stop        Responsible Staff  09/11/20    Name Role Begin End    Gorge Causey M.D. Anesth 2212 2311        Preop Diagnosis (Free Text):  Pre-op Diagnosis     FETAL DECELERATIONS        Preop Diagnosis (Codes):    Post op Diagnosis  Fetal heart rate decelerations affecting management of mother      Premium Reason  A. 3PM - 7AM    Comments:

## 2020-09-12 NOTE — PROGRESS NOTES
Post Partum Progress Note    Name:   Yumiko Obrien   Date/Time:  2020 - 7:19 AM  Chief Admitting Dx:  Pregnancy  Hypertension complicating pregnancy in third trimester  Abnormal fetal heart rate affecting pregnancy  Indication for care in labor and delivery, antepartum  Delivery Type:   for fetal distress  Post-Op/Post Partum Days #:  1    Subjective:  Abdominal pain: yes  Ambulating:   no  Tolerating liquids:  yes  Tolerating food:  No - patient hasn't eaten since the procedure but desires to try and eat   Flatus:   no  BM:    no  Bleeding:   with a small amount of bleeding  Voiding:   yes  Dizziness:   no  Feeding:   Baby is currently in the NICU     Vitals:    20 0330 20 0415 20 0532 20 0600   BP:    133/78   Pulse: 88 82 90 89   Resp:  20   Temp:    37.2 °C (98.9 °F)   TempSrc:    Temporal   SpO2: 95% 94% 96% 94%   Weight:       Height:           Exam:  Abdomen: Abdomen soft, non-tender. BS normal. No masses,  No organomegaly  Fundal Tenderness:  yes  Fundus Firm: yes  Incision: dry and intact  Below umbilicus: yes  Perineum: perineum intact  Lochia: mild  Extremities: Normal extremities, peripheral pulses and reflexes normal  - 1+ peripheral edema to the mid shin     Meds:  Current Facility-Administered Medications   Medication Dose   • acetaminophen (TYLENOL) tablet 1,000 mg  1,000 mg   • ketorolac (TORADOL) injection 30 mg  30 mg   • oxyCODONE immediate-release (ROXICODONE) tablet 5 mg  5 mg   • oxyCODONE immediate release (ROXICODONE) tablet 10 mg  10 mg   • HYDROmorphone pf (DILAUDID) injection 0.2 mg  0.2 mg   • HYDROmorphone pf (DILAUDID) injection 0.4 mg  0.4 mg   • ePHEDrine injection 10 mg  10 mg   • ondansetron (ZOFRAN) syringe/vial injection 4 mg  4 mg   • diphenhydrAMINE (BENADRYL) injection 12.5 mg  12.5 mg   • diphenhydrAMINE (BENADRYL) injection 12.5 mg  12.5 mg    Or   • diphenhydrAMINE (BENADRYL) injection 25 mg  25 mg    Or   • naloxone (NARCAN)  0.4 mg in NS 1,000 mL infusion  0.4 mg   • LR infusion     • PRN oxytocin (PITOCIN) (20 Units/1000 mL) PRN for excessive uterine bleeding - See Admin Instr  125-999 mL/hr   • carboPROST (HEMABATE) injection 250 mcg  250 mcg   • miSOPROStol (CYTOTEC) tablet 600 mcg  600 mcg   • docusate sodium (COLACE) capsule 100 mg  100 mg   • simethicone (MYLICON) chewable tab 80 mg  80 mg   • prenatal plus vitamin (STUARTNATAL 1+1) 27-1 MG tablet 1 Tab  1 Tab   • betamethasone acetate-betamethasone sodium phosphate (CELESTONE) injection 12 mg  12 mg   • labetalol (NORMODYNE/TRANDATE) injection 20-80 mg  20-80 mg    Or   • hydrALAZINE (APRESOLINE) injection 5-10 mg  5-10 mg   • azithromycin (ZITHROMAX) 500 mg in D5W 250 mL IVPB premix  500 mg   • lactated ringers (LR) infusion     • LR infusion     • oxytocin (PITOCIN) infusion (for postpartum)   mL/hr       Labs:   Recent Labs     09/10/20  1517 20  1500   WBC 9.7 9.0   RBC 4.74 4.55   HEMOGLOBIN 12.1 11.9*   HEMATOCRIT 37.9 37.3   MCV 80.0* 82.0   MCH 25.5* 26.2*   MCHC 31.9* 31.9*   RDW 43.5 44.0   PLATELETCT 298 275   MPV 9.7 9.8       Assessment:  Chief Admitting Dx:  Pregnancy  Hypertension complicating pregnancy in third trimester  Abnormal fetal heart rate affecting pregnancy  Indication for care in labor and delivery, antepartum  Delivery Type:   for fetal distress  Tubal Ligation:  No    20  POD1 s/p  at 34w3d. She was sent over from the pregnancy center with elevated Bps and prolonged fetal decels. The patient was admitted for monitoring and betamethasone administration, but the fetal heart tracing did not improve. The patient then received a . Current post-partum course is uncomplicated.     Plan:  Continue routine post partum care.    Please do strict I&O's while patient has nieves in - notify provider if urine output < 30ml per hour      - Post operative care: encourage ambulation, abdominal binder PRN, may shower when  indicated, advance diet as tolerated to regulars.   - Pain control: Motrin 600mg q 6hrs around the clock and alternating with 650mg Tylenol and use Oxycodone 5-10mg PO for break through pain.           Anil Huerta M.D.

## 2020-09-12 NOTE — ANESTHESIA POSTPROCEDURE EVALUATION
Patient: Yumiko Obrien    Procedure Summary     Date: 20 Room / Location: [facility] OR  / LABOR AND DELIVERY    Anesthesia Start:  Anesthesia Stop:     Procedure:  SECTION, PRIMARY (Abdomen) Diagnosis: (FETAL DECELERATIONS; delivered)    Surgeon: Wade Bustamante M.D. Responsible Provider: Gorge Causey M.D.    Anesthesia Type: spinal ASA Status: 2          Final Anesthesia Type: spinal  Last vitals  BP   Blood Pressure: 128/73    Temp   36.6 °C (97.9 °F)    Pulse   Pulse: 67   Resp   19    SpO2   95 %      Anesthesia Post Evaluation    Patient location during evaluation: PACU  Patient participation: complete - patient participated  Level of consciousness: awake and alert  Pain score: 3    Airway patency: patent  Anesthetic complications: no  Cardiovascular status: hemodynamically stable  Respiratory status: acceptable  Hydration status: euvolemic    PONV: none           Nurse Pain Score: 3 (NPRS)

## 2020-09-12 NOTE — OR SURGEON
Immediate Post OP Note    PreOp Diagnosis: 1. IUP@ 34W3D  2.  Gestational hypertension 3.  Nonreassuring fetal heart rate tracing not in labor 4.  Obesity Complicating pregnancy 5. Hx of Covid 19 infection    PostOp Diagnosis: Same    Procedure(s):   SECTION, PRIMARY    Surgeon(s):  Wade Bustamante M.D.    Assistant:  TREY Ribeiro    Anesthesiologist/Type of Anesthesia:  Anesthesiologist: Gorge Causey M.D./Spinal    Surgical Staff:  * No surgical staff found *    Specimens removed if any:  Placenta and cord gases    Estimated Blood Loss: 500 mL, urine output 400 mL    Findings: Liveborn male in vertex presentation, clear amniotic fluid, umbilical cord wrapped around the right leg.  Normal-appearing placenta.  Normal tubes and ovaries.  Apgar score, cord gases and weight are pending    Complications: None apparent        2020 11:28 PM Wade Bustamante M.D.

## 2020-09-12 NOTE — CARE PLAN
Problem: Safety  Goal: Free from accidental injury  Outcome: PROGRESSING AS EXPECTED  Note: Bed in locked and low position. Side rails up x2. Call light within reach. Educated on calling for assistance.  Bed in locked and low position. Side rails up x2. Call light within reach. Educated on calling for assistance.       Problem: Knowledge Deficit  Goal: Patient/Support Person demonstrates understanding regarding condition, prognosis and treatment needs during the pregnancy  Outcome: PROGRESSING AS EXPECTED  Note: Ongoing discussion of POC. Patient and family encourages to state needs.

## 2020-09-12 NOTE — ANESTHESIA PREPROCEDURE EVALUATION
,  for fetal decelerations.    Relevant Problems   No relevant active problems       Physical Exam    Airway   Mallampati: II  TM distance: >3 FB  Neck ROM: full       Cardiovascular - normal exam  Rhythm: regular  Rate: normal  (-) murmur     Dental - normal exam           Pulmonary - normal exam  Breath sounds clear to auscultation     Abdominal    Neurological - normal exam                 Anesthesia Plan    ASA 2       Plan - spinal   Neuraxial block will be primary anesthetic            Postoperative Plan: Postoperative administration of opioids is intended.    Pertinent diagnostic labs and testing reviewed    Informed Consent:    Anesthetic plan and risks discussed with patient.

## 2020-09-12 NOTE — H&P
Obstetrics & Gynecology History & Physical Note    Date of Service  2020    Chief Complaint  Patient was sent over from pregnancy center with elevated blood pressure and prolonged fetal heart rate deceleration    History of Presenting Illness  Yumiko Obrien is a 20 y.o.  at 34w3d 10/20/2020, by Ultrasound. Patient's last menstrual period was 2020.  Patient was seen today in clinic for nonstress test due to gestational hypertension and was noted to have a prolonged fetal heart rate deceleration for 2 minutes.  She also had some borderline elevated blood pressures and was sent over for evaluation, biophysical profile and preeclampsia work-up.  Biophysical profile was 8 out of 8 but baby was having some intermittent spontaneous decelerations.  Patient reports that she has been only consuming about 3 glasses of fluid per day.  She reports normal fetal movements without any bleeding or leaking.  She had COVID-19 infection earlier this pregnancy and is currently asymptomatic without respiratory symptoms.    Prenatal care is at pregnancy center and is complicated by:  1.  Gestational hypertension  2.  Obesity complicating pregnancy    Patient Active Problem List    Diagnosis Date Noted   • Normal first pregnancy in third trimester 08/10/2020   • COVID-19 virus infection 2020   • Urinary tract infection affecting pregnancy 2020       Obstetric History  OB History    Para Term  AB Living   1             SAB TAB Ectopic Molar Multiple Live Births                    # Outcome Date GA Lbr Joel/2nd Weight Sex Delivery Anes PTL Lv   1 Current                Gynecologic History  Negative    Review of Systems  ROS all organ systems are reviewed and are currently negative    Medical History   has no past medical history of Addisons disease (HCC), Adrenal disorder (HCC), Allergy, Anemia, Anxiety, Arrhythmia, Arthritis, Asthma, Blood transfusion without reported diagnosis, Cancer (HCC),  "Cataract, CHF (congestive heart failure) (HCC), Clotting disorder (HCC), COPD (chronic obstructive pulmonary disease) (HCC), Cushings syndrome (HCC), Depression, Diabetes (HCC), Diabetic neuropathy (HCC), Encounter for long-term (current) use of other medications, GERD (gastroesophageal reflux disease), Glaucoma, Goiter, Head ache, Heart attack (HCC), Heart murmur, HIV (human immunodeficiency virus infection) ([facility]), Hyperlipidemia, Hypertension, IBD (inflammatory bowel disease), Kidney disease, Meningitis, Migraine, Muscle disorder, Osteoporosis, Parathyroid disorder (HCC), Pituitary disease (HCC), Pulmonary emphysema (HCC), Seizure (HCC), Sickle cell disease (HCC), Stroke (HCC), Substance abuse (HCC), Thyroid disease, Tuberculosis, or Urinary tract infection.    Surgical History   has no past surgical history on file.     Family History  family history includes Diabetes in her maternal grandmother; No Known Problems in her brother, father, mother, and sister.     Social History   reports that she has never smoked. She has never used smokeless tobacco. She reports previous alcohol use. She reports previous drug use.    Allergies  No Known Allergies    Medications  Prior to Admission Medications   Prescriptions Last Dose Informant Patient Reported? Taking?   Prenatal Vit-Fe Fumarate-FA (PRENATAL 1+1 PO)   Yes No   Sig: Take  by mouth.      Facility-Administered Medications: None       Physical Exam  Vitals:    09/11/20 1646 09/11/20 1700 09/11/20 1703 09/11/20 1723   BP: 153/90  154/92 148/95   Pulse: 76  76 70   Weight:  89.4 kg (197 lb)     Height:  1.626 m (5' 4.02\")         General:   no acute distress, alert, cooperative, moderately obese, no distress   Skin:   normal   HEENT:  Sclera clear, anicteric, Neck supple with midline trachea, Thyroid without masses, Trachea midline and extraocular movements intact   Lungs:   CTA bilateral, clear to auscultation bilaterally   Heart:   regular rate and rhythm   Breasts:  "  no skin dimpling or peau d'orange, deferred   Abdomen:  Abdomen soft, non-tender; gravid.   Pelvis: External genitalia: normal general appearance   Neuro: No gross deficits on neurologic exam  Psych: Appropriate mood and affect  Extremities: No clubbing or cyanosis.  Trace edema.  Normal DTRs      NST per my read:  Indications: GHTN    FHR baseline: 140s  Accelerations present with intermittent variable type decelerations  Moderate FHR variability present  [place]: Few irregular contractions noted-not felt by patient  NST is reactive      Laboratory:  Prenatal Results     General (Most Recent Result)     Test Value Reference Range Date Time    ABO O   04/20/20 1700    Rh POS   04/20/20 1700    Antibody screen NEG   04/20/20 1700    HbA1c        Gonorrhea Negative  Negative 04/20/20 1700    Chlamydia  by PCR Negative  Negative 04/20/20 1700    Chlamydia by DNA        RPR/Syphilus Non-Reactive  Non-Reactive 07/16/20 1106    HSV 1/2 by PCR (non-serum)        HSV 1/2 (serum)        HSV 1        HSV 2        HPV (16)        HPV (18)        HPV (other)        HBsAg Non-Reactive  Non-Reactive 04/20/20 1700    HIV-1 HIV-2 Antibodies Non-Reactive  Non Reactive 04/20/20 1700    Rubella 78.50 IU/mL  04/20/20 1700    Tb              Pap Smear (Most Recent Result)     Test Value Reference Range Date Time    Pap smear        Pap smear w/HPV        Pap smear w/CTNG        Pap smar w/HPV CTNG        Pap smear (reflex HPV ACUS)        Pap smear (reflex HPV ASCUS w/CTNG)        Pathology gyn specimen              Urinalysis (Most Recent Result)     Test Value Reference Range Date Time    Urinalysis        Urinalysis (culture if indicated)        POC urinalysis  (See Report)    09/08/20 1138    Urine drug screen        Urine drug screen (w/o conf)        Urine culture ([id number])        Urine culture ([id number])              1st Trimester     Test Value Reference Range Date Time    Hgb 12.3 g/dL 12.0 - 16.0 04/20/20 1700    Hct 38.1 %  37.0 - 47.0 04/20/20 1700    Fasting Glucose Tolerance        GTT, 1 hour        GTT, 2 hours        GTT, 3 hours              2nd Trimester     Test Value Reference Range Date Time    Hgb 11.5 g/dL 12.0 - 16.0 07/23/20 1652      12.9 g/dL 12.0 - 16.0 07/16/20 1106    Hct 36.3 % 37.0 - 47.0 07/23/20 1652      41.2 % 37.0 - 47.0 07/16/20 1106    Urinalysis        Urine Culture        AST 23 U/L 12 - 45 07/23/20 1652    ALT 19 U/L 2 - 50 07/23/20 1652    Uric Acid        Fasting Glucose Tolerance        GTT, 1 hour        GTT, 2 hours        GTT, 3 hours        Urine Protein/Creatinine Ratio  Abnormal    (See Report)    07/23/20 1652          3rd Trimester     Test Value Reference Range Date Time    Hgb 11.9 g/dL 12.0 - 16.0 09/11/20 1500      12.1 g/dL 12.0 - 16.0 09/10/20 1517    Hct 37.3 % 37.0 - 47.0 09/11/20 1500      37.9 % 37.0 - 47.0 09/10/20 1517    Platelet count 275 K/uL 164 - 446 09/11/20 1500      298 K/uL 164 - 446 09/10/20 1517    GBS (GUTIÉRREZ BROTH)        GBS (Direct)        Urinalysis        Urine Culture        Urine Drug Screen        Urine Protein/Creatinine Ratio  Abnormal    (See Report)    09/11/20 1500          Congenital Disease Screening     Test Value Reference Range Date Time    First Trimester Screen        Quad Screen        Sickle Cell        Thalassemia        [facility]        Cystic Fibrosis Carrier Study                  Results for CYRUS TREADWELL (MRN [id number]) as of 9/11/2020 17:56   Ref. Range 9/11/2020 15:00 9/11/2020 15:10 9/11/2020 16:10   WBC Latest Ref Range: 4.8 - 10.8 K/uL 9.0     RBC Latest Ref Range: 4.20 - 5.40 M/uL 4.55     Hemoglobin Latest Ref Range: 12.0 - 16.0 g/dL 11.9 (L)     Hematocrit Latest Ref Range: 37.0 - 47.0 % 37.3     MCV Latest Ref Range: 81.4 - 97.8 fL 82.0     MCH Latest Ref Range: 27.0 - 33.0 pg 26.2 (L)     MCHC Latest Ref Range: 33.6 - 35.0 g/dL 31.9 (L)     RDW Latest Ref Range: 35.9 - 50.0 fL 44.0     Platelet Count Latest Ref Range: 164 - 446 K/uL  275     MPV Latest Ref Range: 9.0 - 12.9 fL 9.8     Neutrophils-Polys Latest Ref Range: 44.00 - 72.00 % 61.30     Neutrophils (Absolute) Latest Ref Range: 2.00 - 7.15 K/uL 5.51     Lymphocytes Latest Ref Range: 22.00 - 41.00 % 25.40     Lymphs (Absolute) Latest Ref Range: 1.00 - 4.80 K/uL 2.28     Monocytes Latest Ref Range: 0.00 - 13.40 % 9.30     Monos (Absolute) Latest Ref Range: 0.00 - 0.85 K/uL 0.84     Eosinophils Latest Ref Range: 0.00 - 6.90 % 2.80     Eos (Absolute) Latest Ref Range: 0.00 - 0.51 K/uL 0.25     Basophils Latest Ref Range: 0.00 - 1.80 % 0.40     Baso (Absolute) Latest Ref Range: 0.00 - 0.12 K/uL 0.04     Immature Granulocytes Latest Ref Range: 0.00 - 0.90 % 0.80     Immature Granulocytes (abs) Latest Ref Range: 0.00 - 0.11 K/uL 0.07     Nucleated RBC Latest Units: /100 WBC 0.00     NRBC (Absolute) Latest Units: K/uL 0.00     Sodium Latest Ref Range: 135 - 145 mmol/L 137     Potassium Latest Ref Range: 3.6 - 5.5 mmol/L 4.2     Chloride Latest Ref Range: 96 - 112 mmol/L 103     Co2 Latest Ref Range: 20 - 33 mmol/L 20     Anion Gap Latest Ref Range: 7.0 - 16.0  14.0     Glucose Latest Ref Range: 65 - 99 mg/dL 75     Bun Latest Ref Range: 8 - 22 mg/dL 12     Creatinine Latest Ref Range: 0.50 - 1.40 mg/dL 0.46 (L)     GFR If  Latest Ref Range: >60 mL/min/1.73 m 2 >60     GFR If Non  Latest Ref Range: >60 mL/min/1.73 m 2 >60     Calcium Latest Ref Range: 8.5 - 10.5 mg/dL 8.9     AST(SGOT) Latest Ref Range: 12 - 45 U/L 20     ALT(SGPT) Latest Ref Range: 2 - 50 U/L 13     Alkaline Phosphatase Latest Ref Range: 30 - 99 U/L 214 (H)     Total Bilirubin Latest Ref Range: 0.1 - 1.5 mg/dL <0.2     Albumin Latest Ref Range: 3.2 - 4.9 g/dL 3.3     Total Protein Latest Ref Range: 6.0 - 8.2 g/dL 6.0     Globulin Latest Ref Range: 1.9 - 3.5 g/dL 2.7     A-G Ratio Latest Units: g/dL 1.2     Uric Acid Latest Ref Range: 1.9 - 8.2 mg/dL 5.2     Creatinine, Random Urine Latest Units:  mg/dL 35.08     Total Protein, Urine Latest Ref Range: 0.0 - 15.0 mg/dL 68.0 (H)     Protein Creatinine Ratio Latest Ref Range: 10 - 107 mg/g 1938 (H)     POC Color Unknown  Yellow    POC Appearance Unknown  Clear    POC Specific Gravity Latest Ref Range: 1.005 - 1.030   1.015    POC Urine PH Latest Ref Range: 5.0 - 8.0   7.0    POC Glucose Latest Ref Range: Negative mg/dL  Negative    POC Ketones Latest Ref Range: Negative mg/dL  Negative    POC Protein Latest Ref Range: Negative mg/dL  100 (A)    POC Nitrites Latest Ref Range: Negative   Negative    POC Leukocyte Esterase Latest Ref Range: Negative   Moderate (A)    POC Blood Latest Ref Range: Negative   Negative        Urinalysis:    Results for CYRUS TREADWELL (MRN [id number]) as of 2020 17:56   Ref. Range 2020 15:10   POC Color Unknown Yellow   POC Appearance Unknown Clear   POC Specific Gravity Latest Ref Range: 1.005 - 1.030  1.015   POC Urine PH Latest Ref Range: 5.0 - 8.0  7.0   POC Glucose Latest Ref Range: Negative mg/dL Negative   POC Ketones Latest Ref Range: Negative mg/dL Negative   POC Protein Latest Ref Range: Negative mg/dL 100 (A)   POC Nitrites Latest Ref Range: Negative  Negative   POC Leukocyte Esterase Latest Ref Range: Negative  Moderate (A)   POC Blood Latest Ref Range: Negative  Negative       Imaging:  BPP is 8 out of 8 with CELESTE 12 cm per preliminary report        Assessment:  20 y.o.  34w3d   Hypertension complicating pregnancy in third trimester  Abnormal fetal heart rate affecting pregnancy  Indication for care in labor and delivery, antepartum    Plan:  No new Assessment & Plan notes have been filed under this hospital service since the last note was generated.  Service: Obstetrics & Gynecology    1. Admit to L&D antepartum  2.  Continuous fetal monitoring  3.  Start betamethasone and 24-hour urine collection  4.  If fetal heart rate tracing does not improve, will proceed with primary   5.  Plan of care  discussed    VTE prophylaxis:Christian Bustamante M.D.

## 2020-09-12 NOTE — PROGRESS NOTES
EDC 10/20 34 3/     1440-pt presents from the office for PIH work up due to elevation in pressures and BPP due to decel in office, no c/o leaking, bleeding, pain or uc's, states baby is moving normally, placed on external monitors, vs taken and set for q 10 min, denies current headache, visual changes and RUQ pain, reflexes normal, no clonus, 2+ pitting edema BL in lower extremities, PIH labs and BPP ordered per Dr Bustamante  1500-labs drawn and sent  1516-US here for BPP  1628-back on monitors, BPP  CELESTE 12 cm, Dr Bustamante in surgery, will give report when he is out  1648-FHR down to 80's x 1 min with mod return to BL  1705-report given to Dr Bustamante, will be in to assess pt  1720-Dr Bustamante in to assess pt, admission to antepartum order received  1724-FHR down to 90's x 90 sec with moderate return to BL  1732-FHR down to 70's x 60 sec with slower return to BL  1735-report given to EVELINE Reza RN, POC discussed, Dr Bustamante informed of last two decels, pt is NPO at this time  1740-pt transferred to antepartum per w/c

## 2020-09-12 NOTE — PROGRESS NOTES
0700 Received bedside report from YANA Duran. Discussed plan of care. Patient will call for pain medication as needed. All needs met at this time.

## 2020-09-12 NOTE — ANESTHESIA PROCEDURE NOTES
Spinal Block    Date/Time: 9/11/2020 10:18 PM  Performed by: Gorge Causey M.D.  Authorized by: Gorge Causey M.D.     Patient Location:  OB  Start Time:  9/11/2020 10:18 PM  End Time:  9/11/2020 10:21 PM  Reason for Block: primary anesthetic    patient identified, IV checked, site marked, risks and benefits discussed, surgical consent, monitors and equipment checked, pre-op evaluation and timeout performed    Patient Position:  Sitting  Prep: ChloraPrep, patient draped and sterile technique    Monitoring:  Blood pressure, continuous pulse oximetry and heart rate  Approach:  Midline  Location:  L3-4  Injection Technique:  Single-shot  Skin infiltration:  Lidocaine  Strength:  1%  Dose:  3ml  Needle Type:  Pencan  Needle Gauge:  25 G  CSF flowing pre/post injection:  Yes  Sensory Level:  T6

## 2020-09-12 NOTE — PROGRESS NOTES
Antepartum progress Note    Subjective: Patient continues to have spontaneous return of decelerations that are prolonged in nature with minimal fetal heart rate variability.  She is asymptomatic currently.    Objective Data:  Recent Labs     09/10/20  1517 09/11/20  1500   WBC 9.7 9.0   RBC 4.74 4.55   HEMOGLOBIN 12.1 11.9*   HEMATOCRIT 37.9 37.3   MCV 80.0* 82.0   MCH 25.5* 26.2*   MCHC 31.9* 31.9*   RDW 43.5 44.0   PLATELETCT 298 275   MPV 9.7 9.8     Recent Labs     09/10/20  1517 09/11/20  1500   SODIUM 139 137   POTASSIUM 4.0 4.2   CHLORIDE 106 103   CO2 18* 20   GLUCOSE 132* 75   BUN 12 12   CREATININE 0.52 0.46*   CALCIUM 9.0 8.9       Vitals:    09/11/20 1932 09/11/20 2002 09/11/20 2032 09/11/20 2102   BP: 133/81 120/70 130/63 112/63   Pulse: 72 67 69 62   Resp:  18 18    Temp:  37.1 °C (98.7 °F) 37.1 °C (98.7 °F)    TempSrc:  Temporal Temporal    Weight:       Height:           No intake or output data in the 24 hours ending 09/11/20 2157    Current Facility-Administered Medications   Medication Dose Route Frequency Provider Last Rate Last Dose   • LR infusion   Intravenous Continuous Wade Bustamante M.D. 125 mL/hr at 09/11/20 1904     • betamethasone acetate-betamethasone sodium phosphate (CELESTONE) injection 12 mg  12 mg Intramuscular Q24HR Wade Bustamante M.D.   12 mg at 09/11/20 1827   • labetalol (NORMODYNE/TRANDATE) injection 20-80 mg  20-80 mg Intravenous PRN Wade Bustamante M.D.        Or   • hydrALAZINE (APRESOLINE) injection 5-10 mg  5-10 mg Intravenous PRN Wade Bustamante M.D.       • LACTATED RINGERS IV SOLN            • azithromycin (ZITHROMAX) 500 mg in D5W 250 mL IVPB premix  500 mg Intravenous Q24HRS Wade Bustamante M.D. 250 mL/hr at 09/11/20 2144 500 mg at 09/11/20 2144   • lactated ringers (LR) infusion   Intravenous Continuous Wade Bustamante M.D.       • Na citrate-citric acid (BICITRA) 500-334 MG/5ML solution 30 mL  30 mL Oral Once Wade Bustamante M.D.       • famotidine  (PEPCID) injection 20 mg  20 mg Intravenous Once Wade Bustamante M.D.       • metoclopramide (REGLAN) injection 10 mg  10 mg Intravenous Once Wade Bustamante M.D.         Discussion:  Discussed with the patient indications for  delivery-nonreassuring fetal heart rate tracing. The patient voiced understanding of indications for  section at this time.    Discussed with the patient the risks of  delivery. The risks include infection, bleeding, scarring, damage to other organs in the area of operation. Specifically organs that can be damaged are bowel, bladder, ureters. I also discussed with the patient the risk of wound infection and wound breakdown. We discussed that these risks are greater in people that have diabetes or obesity. I also discussed the risk of emergency blood transfusion during procedure as well as emergency hysterectomy during procedure.  We discussed that baby is premature and we discussed prematurity risks    Patient had the opportunity to ask questions regarding procedures. All questions answered to the patient's satisfaction.  Form consent obtained for primary   Proceed with  delivery     Assessment:   20-year-old G1 at 34 weeks and 3 days gestation  Gestational hypertension complicating pregnancy  Elevated blood pressure with elevated protein creatinine ratio suspicious for preeclampsia  Nonreassuring fetal heart rate tracing with repetitive prolonged decelerations and minimal fetal heart rate variability  Status post betamethasone x1 dose  History of COVID 19 infection in pregnancy    Plan:   Due to nonreassuring fetal heart rate tracing does persistent, recommend delivery and patient agrees.  Primary  discussed and patient agrees for .  Informed consents obtained  We will proceed with  delivery

## 2020-09-12 NOTE — PROGRESS NOTES
1745- Report received from CHAD Patterson. Pt transferred to ante room orders received.     1805- Dr. Bustamante to bedside. Tracing reviewed. C section consent signed in case surgery is needed urgently. Pt educated and consents. Betamethasone given per MAR.    1900- Report to ADRIEL Lombardo RN. POC discussed.

## 2020-09-12 NOTE — OP REPORT
DATE OF SERVICE:  2020    PREOPERATIVE DIAGNOSES:    1.  Intrauterine pregnancy at 34 weeks and 3 days' gestation.  2.  Gestational hypertension.  3.  Nonreassuring fetal heart rate tracing, not in labor.  4.  Obesity complicating pregnancy.    POSTOPERATIVE DIAGNOSES:  1.  Intrauterine pregnancy at 34 weeks and 3 days' gestation.  2.  Gestational hypertension.  3.  Nonreassuring fetal heart rate tracing, not in labor.  4.  Obesity complicating pregnancy.    PROCEDURE:  Primary low transverse  section via Pfannenstiel.    SURGEON:  Wade Bustamante MD    ASSISTANT:  TREY Ribeiro    ANESTHESIA:  Spinal.    ANESTHESIOLOGIST:  Gorge Causey MD    SPECIMEN:  Placenta and cord gas.    ESTIMATED BLOOD LOSS:  500 mL.    URINE OUTPUT:  400 mL.    INDICATIONS:  Patient is a 20-year-old  1 at 34 weeks and 3 days'   gestation who was seen in the clinic today for fetal monitoring and had one   prolonged fetal heart rate deceleration with some borderline elevated blood   pressures.  She was sent over for extended monitoring, BPP and preeclampsia   workup.  Preeclampsia labs were normal except for significantly elevated   protein:creatinine ratio over 1100 and BPP was 8/8.  On external fetal   monitoring, baby was having repetitive prolonged deceleration that did not   resolve with very minimal fetal heart rate variability present despite fluid resuscitation,   oxygen therapy and positional changes.   delivery was recommended and   informed consents were obtained with indication for non-reassuring fetal heart   rate tracing.      FINDINGS:  A liveborn male in vertex presentation.  There was clear amniotic   fluid.  The umbilical cord was wrapped around the right leg.  Placenta was   normal in appearance.  There were normal tubes and ovaries bilaterally.  Apgar   score, cord gas results and weight are currently pending.    COMPLICATIONS:  None apparent.    PROCEDURE:  After informed consents  were obtained, patient was taken to the   operating room where spinal anesthetic was administered.  She was prepped and   draped in normal sterile fashion in dorsal supine position in leftward tilt.    Preoperative antibiotics were given.  A formal timeout was called.  A   Pfannenstiel skin incision was made with a scalpel and the incision was taken   down to the fascia.  The fascia was incised in the midline and the incision   was extended laterally with curved Clemente scissors.  The fascial edges were   grasped with Kocher clamps, elevated and the rectus muscle was dissected off   sharply.  Rectus muscle was  in the midline.  The peritoneum was   identified and entered sharply with Metzenbaum scissors and the incision was   extended cephalad and caudad with good visualization of the bladder.  Bladder   blade was inserted.  The vesicouterine peritoneum was identified and entered   sharply with Metzenbaum scissors and the incision was extended laterally.    Bladder flap was created digitally.  Bladder blade was removed and reinserted.    A low uterine transverse incision was made with a scalpel and the uterine   cavity was entered.  The incision was extended laterally with bandage   scissors.  The baby's head was delivered atraumatically.  The mouth and nose   were suctioned with bulb suction.  The cord was clamped and cut and baby was   handed off active and crying.  Cord clamping was delayed by 30 seconds per   nursery team recommendation.  A section of the cord was clamped, cut and sent   off for gases.  Placenta was expressed from the uterus.  The uterus was   exteriorized and cleared of all clots and debris.  Hysterotomy repair was   performed in 2 layers with 0 Vicryl sutures, the first was a running locked   layer followed by an imbricating layer with hemostasis achieved.  Uterus was   returned to the abdominal cavity.  The abdominal cavity was copiously   irrigated with warm normal saline.  The gutters  were cleared of all clots and   debris.  Hysterotomy site was hemostatic on inspection.  The peritoneum was   then tagged with Betsy clamps and reapproximated with 2-0 Vicryl suture in a   running fashion.  Rectus muscle was reapproximated in the midline.  The fascia   was reapproximated with 0 Vicryl suture in a running fashion.  Subcutaneous   tissue was irrigated and reapproximated with 2-0 plain suture in a running   fashion.  The skin was closed in a subcuticular fashion with 4-0 Vicryl   suture.  Benzoin and Steri-Strip was applied to the incision followed by   sterile dressing.    Patient tolerated the procedure well.  Sponge, lap, needle and instrument   counts were correct x3.  Patient was taken to the PACU in a stable fashion.    Baby was transferred to NICU due to prematurity for observation.       ____________________________________     Wade Bustamante MD RN / FATUMA    DD:  09/11/2020 23:38:28  DT:  09/12/2020 00:14:23    D#:  [id number]  Job#:  [id number]

## 2020-09-12 NOTE — ANESTHESIA QCDR
2019 [facility] Clinical Data Registry (for Quality Improvement)     Postoperative nausea/vomiting risk protocol (Adult = 18 yrs and Pediatric 3-17 yrs)- (430 and 463)  General inhalation anesthetic (NOT TIVA) with PONV risk factors: No  Provision of anti-emetic therapy with at least 2 different classes of agents: N/A  Patient DID NOT receive anti-emetic therapy and reason is documented in Medical Record: N/A    Multimodal Pain Management- (477)  Non-emergent surgery AND patient age >= 18: Yes  Use of Multimodal Pain Management, two or more drugs and/or interventions, NOT including systemic opioids: Yes  Exception: Documented allergy to multiple classes of analgesics: N/A    Smoking Abstinence (404)  Patient is current smoker (cigarette, pipe, e-cig, marijuanna): No  Elective Surgery:   Abstinence instructions provided prior to day of surgery:   Patient abstained from smoking on day of surgery:     Pre-Op Beta-Blocker in Isolated CABG (44)  Isolated CABG AND patient age >= 18: No  Beta-blocker admin within 24 hours of surgical incision:   Exception:of medical reason(s) for not administering beta blocker within 24 hours prior to surgical incision (e.g., not  indicated,other medical reason):     PACU assessment of acute postoperative pain prior to Anesthesia Care End- Applies to Patients Age = 18- (ABG7)  Initial PACU pain score is which of the following: < 7/10  Patient unable to report pain score: N/A    Post-anesthetic transfer of care checklist/protocol to PACU/ICU- (426 and 427)  Upon conclusion of case, patient transferred to which of the following locations: PACU/Non-ICU  Use of transfer checklist/protocol: Yes  Exclusion: Service Performed in Patient Hospital Room (and thus did not require transfer): N/A  Unplanned admission to ICU related to anesthesia service up through end of PACU care- (MD51)  Unplanned admission to ICU (not initially anticipated at anesthesia start time): No

## 2020-09-12 NOTE — PROGRESS NOTES
Patient to room 319 via [facility]. Report received from YANA Ortiz. IV patent running second bag of pitocin at 125 cc/hr, pulse oximeter and SCD's on and working, nieves draining to gravity. Patient declines pain at this time and states she will call when needing pain medication. Patient oriented to unit and room. Call light and emergency call light use discussed. Patient encouraged to call with any needs and or concerns.

## 2020-09-12 NOTE — PROGRESS NOTES
190 Report received from Breanna MILLAN. Patient resting comfortably in bed with FOB at bedside. POC discussed.      Anusha SANDRA updated on patient status. Orders received to continue observation and start 24 hour urine.      Anusha SANDRA notified of FHR decels. Orders to continue observing.      MD called for  section. MD at bedside to inform patient. Pt agrees to c/s.        Patient transferred form floor to OR1 Via bed.     2243 delivery of viable baby boy. [place] taken to NICU.     2316 Patient transferred to PACU from OR in stable condition via gurney.     0030 Patient transferred to postpartum unit via gurney in stable condition. FOB at bedside with personal belongings. Report given to Roger MILLAN.

## 2020-09-13 PROCEDURE — 770002 HCHG ROOM/CARE - OB PRIVATE (112)

## 2020-09-13 PROCEDURE — 700102 HCHG RX REV CODE 250 W/ 637 OVERRIDE(OP): Performed by: OBSTETRICS & GYNECOLOGY

## 2020-09-13 PROCEDURE — A9270 NON-COVERED ITEM OR SERVICE: HCPCS | Performed by: OBSTETRICS & GYNECOLOGY

## 2020-09-13 RX ADMIN — IBUPROFEN 600 MG: 600 TABLET ORAL at 18:01

## 2020-09-13 RX ADMIN — HYDROCODONE BITARTRATE AND ACETAMINOPHEN 1 TABLET: 5; 325 TABLET ORAL at 18:01

## 2020-09-13 RX ADMIN — PRENATAL WITH FERROUS FUM AND FOLIC ACID 1 TABLET: 3080; 920; 120; 400; 22; 1.84; 3; 20; 10; 1; 12; 200; 27; 25; 2 TABLET ORAL at 09:59

## 2020-09-13 NOTE — LACTATION NOTE
ROSELIA is a 21 y/o   who delivered a 34 3/7 gestation infant that was transferred to NICU. She was set up with pumping, but has only pumped 2x because she is not getting anything. Teach the importance of signaling milk production by stimulation of the nipples to protect/promote her milk production since her infant is  from her. Review settings starting @80 speed, lowering to 60 after two minutes, suction to comfort starting between 20-30% (she is using 25%), and to pump for 15 minutes. Schedule taught to pump every 2 1/2 hours in the daytime, the taking a 5 hour rest between 8 or 9 pm to 1-2 am and always pumping two times between 1-6 am for a total of 8 x/24 hours. Review to follow each pumping session with 2-5 minutes of HE; hand expression demo. Teach cleaning of parts.Teach to watch video cam of infant and to cover the bottles as she is pumping; taking any volume pumped, even small drops to NICU for her baby. Review cleaning of parts. Family voices understanding.

## 2020-09-13 NOTE — LACTATION NOTE
Mother reports she is pumping but not yet removing any colostrum. Breasts are widely spaced and hypoplastic in appearance, mother reports some breast growth and areolar darkening during pregnancy. Reviewed breast massage and hand expression, no drops of colostrum removed. Assessed flange fit and pump settings, 25mm flanges, 26% suction to comfort. Mother plans to sign up with [facility], does have manual breast pump at home, provided list of [facility] numbers and option to rent hospital grade breast pump as well. Plan is to massage/pump/express breasts at least 8 times per 24 hours. Denies questions/concerns.

## 2020-09-13 NOTE — CARE PLAN
Problem: Altered physiologic condition related to postoperative  delivery  Goal: Patient physiologically stable as evidenced by normal lochia, palpable uterine involution and vital signs within normal limits  Outcome: PROGRESSING AS EXPECTED  Note: Fundus firm, lochia light. Vitals WDL.      Problem: Potential for postpartum infection related to surgical incision, compromised uterine condition, urinary tract or respiratory compromise  Goal: Patient will be afebrile and free from signs and symptoms of infection  Outcome: PROGRESSING AS EXPECTED  Note: No S/S of infection. Vital signs WDL. Pt. Not in distress at this time. Will continue to monitor.

## 2020-09-13 NOTE — PROGRESS NOTES
Post Partum Progress Note    Name:   Yumiko Obrien   Date/Time:  2020 - 12:19 PM  Chief Admitting Dx:  Pregnancy  Hypertension complicating pregnancy in third trimester  Abnormal fetal heart rate affecting pregnancy  Indication for care in labor and delivery, antepartum  Delivery Type:   for fetal distress  Post-Op/Post Partum Days #:  2    21  admitted GHTN and prolonged FHR deceleration, required emergent  for fetal distress.      Subjective:  Abdominal pain: no  Ambulating:   yes  Tolerating liquids:  yes  Tolerating food:  yes common adult  Flatus:   yes  BM:    no  Bleeding:   with a small amount of bleeding  Voiding:   yes  Dizziness:   no  Feeding:   Baby in NICU    Vitals:    20 1100 20 1400 20 1800 20 0600   BP:  125/77 124/79 131/86   Pulse: 96 73 85 87   Resp: 16 18 18 18   Temp:  37.1 °C (98.8 °F) 36.4 °C (97.6 °F) 36.6 °C (97.8 °F)   TempSrc:  Temporal Temporal Temporal   SpO2: 96% 96% 95% 94%   Weight:       Height:           Exam:  Abdomen: Abdomen soft, non-tender. BS normal. No masses,  No organomegaly  Fundal Tenderness:  yes  Fundus Firm: yes  Incision: dry and intact  Below umbilicus: yes  Perineum: perineum intact  Lochia: mild  Extremities: Normal extremities, peripheral pulses and reflexes normal, no peripheral edema     Meds:  Current Facility-Administered Medications   Medication Dose   • LR infusion     • PRN oxytocin (PITOCIN) (20 Units/1000 mL) PRN for excessive uterine bleeding - See Admin Instr  125-999 mL/hr   • carboPROST (HEMABATE) injection 250 mcg  250 mcg   • miSOPROStol (CYTOTEC) tablet 600 mcg  600 mcg   • docusate sodium (COLACE) capsule 100 mg  100 mg   • simethicone (MYLICON) chewable tab 80 mg  80 mg   • prenatal plus vitamin (STUARTNATAL 1+1) 27-1 MG tablet 1 Tab  1 Tab   • ibuprofen (MOTRIN) tablet 600 mg  600 mg   • HYDROcodone-acetaminophen (NORCO) 5-325 MG per tablet 1 Tab  1 Tab   • HYDROcodone-acetaminophen  (NORCO) 5-325 MG per tablet 2 Tab  2 Tab   • metoclopramide (REGLAN) injection 10 mg  10 mg   • lactated ringers (LR) infusion     • LR infusion     • oxytocin (PITOCIN) infusion (for postpartum)   mL/hr       Labs:   Recent Labs     09/10/20  1517 20  1500 20  0950   WBC 9.7 9.0 15.2*   RBC 4.74 4.55 4.08*   HEMOGLOBIN 12.1 11.9* 10.5*   HEMATOCRIT 37.9 37.3 33.8*   MCV 80.0* 82.0 82.8   MCH 25.5* 26.2* 25.7*   MCHC 31.9* 31.9* 31.1*   RDW 43.5 44.0 44.4   PLATELETCT 298 275 284   MPV 9.7 9.8 10.0       Assessment:    admitted GHTN and prolonged FHR deceleration, required emergent  for fetal distress.     Chief Admitting Dx:  Pregnancy  Hypertension complicating pregnancy in third trimester  Abnormal fetal heart rate affecting pregnancy  Indication for care in labor and delivery, antepartum  Delivery Type:   for fetal distress  Tubal Ligation:  no    Plan:  Continue routine post partum care.  #Post-Op care  - Encourage ambulation, abdominal binder PRN, may shower when indicated, continue regular diet   #Post-Op pain  - Regular tylenol 1000mg, Motrin as needed, and oxycodone for breakthrough pain     Anil Huerta M.D.

## 2020-09-14 LAB — PATHOLOGY CONSULT NOTE: NORMAL

## 2020-09-14 PROCEDURE — 770002 HCHG ROOM/CARE - OB PRIVATE (112)

## 2020-09-14 PROCEDURE — 700102 HCHG RX REV CODE 250 W/ 637 OVERRIDE(OP): Performed by: OBSTETRICS & GYNECOLOGY

## 2020-09-14 PROCEDURE — A9270 NON-COVERED ITEM OR SERVICE: HCPCS | Performed by: OBSTETRICS & GYNECOLOGY

## 2020-09-14 RX ADMIN — PRENATAL WITH FERROUS FUM AND FOLIC ACID 1 TABLET: 3080; 920; 120; 400; 22; 1.84; 3; 20; 10; 1; 12; 200; 27; 25; 2 TABLET ORAL at 09:15

## 2020-09-14 RX ADMIN — DOCUSATE SODIUM 100 MG: 100 CAPSULE ORAL at 09:15

## 2020-09-14 RX ADMIN — IBUPROFEN 600 MG: 600 TABLET ORAL at 09:15

## 2020-09-14 RX ADMIN — HYDROCODONE BITARTRATE AND ACETAMINOPHEN 1 TABLET: 5; 325 TABLET ORAL at 09:15

## 2020-09-14 NOTE — PROGRESS NOTES
Assumed care. Assessment completed, VSS. Fundus firm, lochia scant. Pt. ambulating and voiding. Pt. requests pain medication as needed.POC discussed, all questions answered. Encouraged to call with needs. Will encouraged MOB to pump q 3 hours.

## 2020-09-14 NOTE — DISCHARGE PLANNING
Discharge Planning Assessment Post Partum     Reason for Referral: NICU  Address: [address] [place] [address] [place] [address]  Type of Living Situation: House with FOB, FOB's brother and wife and their 2 year old son.    Mom Diagnosis: Pregnancy   Baby Diagnosis: NICU  Primary Language: English      Name of Baby: Geoffrey Obrien     Mother of the Baby: Yumiko Obrien ([phone])  Father of the Baby: Matthew Watson     Involved in baby’s care? Yes   Contact Information: [phone]     Prenatal Care: Yes   Mom's PCP: None  PCP for new baby: Pediatrician list given to MOB     Support System: Yes  Coping/Bonding between mother & baby: Yes  Source of Feeding: Breast pump  Supplies for Infant: Prepared     Mom's Insurance: [email or web address]     Baby Covered on Insurance: Same as mother.   Mother Employed/School: No   Other children in the home/names & ages: 2 year old boy from brother and sister in law. MOB's 1st child.      Financial Hardship/Income: No  Mom's Mental status: Alert and Oriented x 4  Services used prior to admit: [facility]     CPS History: Denies  Psychiatric History: Denies. LSW explained the difference between PPD and baby blues and encouraged MOB to reach out if she is experiencing any heightened anxiety or depression.   MOB declined postpartum resources.  Domestic Violence History: Denies   Drug/ETOH History: Denies     Resources Provided: Pediatrician list  Referrals Made: None      Clearance for Discharge: Baby is clear to discharge home with MOB/FOB upon medical clearance.      Ongoing Plan: Continue to provide support and resources to family until dc.

## 2020-09-14 NOTE — CARE PLAN
Problem: Altered physiologic condition related to postoperative  delivery  Goal: Patient physiologically stable as evidenced by normal lochia, palpable uterine involution and vital signs within normal limits  Outcome: PROGRESSING AS EXPECTED  Note: Fundus firm and lochia scant.      Problem: Pain Management  Goal: Pain level will decrease to patient's comfort goal  Outcome: PROGRESSING AS EXPECTED  Note: Pt requests pain medication as needed, will continue to assess.

## 2020-09-14 NOTE — LACTATION NOTE
"Mother reports she slept most of the night, re-educated on importance of consistent pumping and not taking longer than a 4-5 hour sleep stretch without breast stimulation. She reports her breasts are feeling \"different today, like something is starting to happen\" and she is excited about this. Educated on onset of lactogenesis stage 2 and establishing milk supply. Continue pumping plan. Reminded to call WIC this morning to make appointment to establish services. Denies questions/concerns. Aware lactation support is available during infant's stay in NICU.   "

## 2020-09-15 ENCOUNTER — PHARMACY VISIT (OUTPATIENT)
Dept: PHARMACY | Facility: MEDICAL CENTER | Age: 20
End: 2020-09-15
Payer: COMMERCIAL

## 2020-09-15 VITALS
BODY MASS INDEX: 33.63 KG/M2 | WEIGHT: 197 LBS | TEMPERATURE: 97.6 F | RESPIRATION RATE: 18 BRPM | HEART RATE: 72 BPM | SYSTOLIC BLOOD PRESSURE: 143 MMHG | OXYGEN SATURATION: 96 % | HEIGHT: 64 IN | DIASTOLIC BLOOD PRESSURE: 90 MMHG

## 2020-09-15 LAB
ERYTHROCYTE [DISTWIDTH] IN BLOOD BY AUTOMATED COUNT: 46.5 FL (ref 35.9–50)
HCT VFR BLD AUTO: 31.5 % (ref 37–47)
HGB BLD-MCNC: 9.9 G/DL (ref 12–16)
MCH RBC QN AUTO: 26.3 PG (ref 27–33)
MCHC RBC AUTO-ENTMCNC: 31.4 G/DL (ref 33.6–35)
MCV RBC AUTO: 83.8 FL (ref 81.4–97.8)
PLATELET # BLD AUTO: 200 K/UL (ref 164–446)
PMV BLD AUTO: 9.7 FL (ref 9–12.9)
RBC # BLD AUTO: 3.76 M/UL (ref 4.2–5.4)
WBC # BLD AUTO: 12 K/UL (ref 4.8–10.8)

## 2020-09-15 PROCEDURE — RXMED WILLOW AMBULATORY MEDICATION CHARGE: Performed by: OBSTETRICS & GYNECOLOGY

## 2020-09-15 PROCEDURE — 700102 HCHG RX REV CODE 250 W/ 637 OVERRIDE(OP): Performed by: OBSTETRICS & GYNECOLOGY

## 2020-09-15 PROCEDURE — A9270 NON-COVERED ITEM OR SERVICE: HCPCS | Performed by: OBSTETRICS & GYNECOLOGY

## 2020-09-15 PROCEDURE — 85027 COMPLETE CBC AUTOMATED: CPT

## 2020-09-15 PROCEDURE — 36415 COLL VENOUS BLD VENIPUNCTURE: CPT

## 2020-09-15 RX ORDER — IBUPROFEN 600 MG/1
600 TABLET ORAL EVERY 6 HOURS PRN
Qty: 30 TAB | Refills: 0 | Status: SHIPPED | OUTPATIENT
Start: 2020-09-15 | End: 2021-04-14

## 2020-09-15 RX ORDER — HYDROCODONE BITARTRATE AND ACETAMINOPHEN 5; 325 MG/1; MG/1
2 TABLET ORAL EVERY 4 HOURS PRN
Qty: 20 TAB | Refills: 0 | Status: SHIPPED | OUTPATIENT
Start: 2020-09-15 | End: 2020-09-20

## 2020-09-15 RX ORDER — BISACODYL 5 MG/1
5 TABLET, DELAYED RELEASE ORAL DAILY
Qty: 60 TAB | Refills: 0 | Status: SHIPPED | OUTPATIENT
Start: 2020-09-15 | End: 2021-04-14

## 2020-09-15 RX ADMIN — PRENATAL WITH FERROUS FUM AND FOLIC ACID 1 TABLET: 3080; 920; 120; 400; 22; 1.84; 3; 20; 10; 1; 12; 200; 27; 25; 2 TABLET ORAL at 09:28

## 2020-09-15 RX ADMIN — IBUPROFEN 600 MG: 600 TABLET ORAL at 09:29

## 2020-09-15 RX ADMIN — DOCUSATE SODIUM 100 MG: 100 CAPSULE ORAL at 09:30

## 2020-09-15 RX ADMIN — HYDROCODONE BITARTRATE AND ACETAMINOPHEN 1 TABLET: 5; 325 TABLET ORAL at 09:28

## 2020-09-15 ASSESSMENT — PAIN DESCRIPTION - PAIN TYPE
TYPE: SURGICAL PAIN
TYPE: SURGICAL PAIN

## 2020-09-15 NOTE — CARE PLAN
Problem: Fluid Volume:  Goal: Will maintain balanced intake and output  Outcome: PROGRESSING AS EXPECTED  Note: Fundus firm at umbilicus with light lochia.      Problem: Potential for postpartum infection related to surgical incision, compromised uterine condition, urinary tract or respiratory compromise  Goal: Patient will be afebrile and free from signs and symptoms of infection  Outcome: PROGRESSING AS EXPECTED  Note: Afebrile with incision clean,dry, approximated with steristrips and open to air.      Problem: Alteration in comfort related to surgical incision and/or after birth pains  Goal: Patient is able to ambulate, care for self and infant with acceptable pain level  Outcome: PROGRESSING AS EXPECTED  Note: Ambulating  and voiding without difficulty.   Goal: Patient verbalizes acceptable pain level  Outcome: PROGRESSING AS EXPECTED  Note: Denies pain most of the time. Instructed patient to call if having pain or discomfort.     Problem: Potential anxiety related to difficulty adapting to parental role  Goal: Patient will verbalize and demonstrate effective bonding and parenting behavior  Outcome: PROGRESSING AS EXPECTED  Note: Patient and FOB visit infant in NICU.     Problem: Potential knowledge deficit related to lack of understanding of self and  care  Goal: Patient will demonstrate ability to care for self and infant  Outcome: PROGRESSING SLOWER THAN EXPECTED  Note: Patient is using breast pump but needs reinforcement to use breast pump consistently.

## 2020-09-15 NOTE — LACTATION NOTE
Evaluated pump use to include frequency, duration, suction and speed settings.Progression to breastfeeding discussed with mother.     Outlined the supportive measures that should be in place for now, to include pumping strategies, hand expression and intrinsic factors (smell, touch, sight and visualization).     Encouraged parents to utilize Lactation services as need during baby's hospitalization, discussed protocol for that.    Mother plans to use her personal breast pump but is aware of concerns and was provided specific information on renting hospital grade. Instructed to take her pump kit parts home with her.

## 2020-09-15 NOTE — DISCHARGE SUMMARY
Discharge Summary:      Yumiko Obrien    Admit Date:   2020  Discharge Date:  9/15/2020     Admitting diagnosis:  Pregnancy  Hypertension complicating pregnancy in third trimester  Abnormal fetal heart rate affecting pregnancy  Indication for care in labor and delivery, antepartum  Discharge Diagnosis: Status post  for fetal distress.  Pregnancy Complications: none  Tubal Ligation:  no        History:  No past medical history on file.  OB History    Para Term  AB Living   1 1   1   1   SAB TAB Ectopic Molar Multiple Live Births           0 1      # Outcome Date GA Lbr Joel/2nd Weight Sex Delivery Anes PTL Lv   1  20 34w3d  1.7 kg (3 lb 12 oz) M CS-LTranv Spinal Y DANIELA      Complications: Fetal Intolerance        Sulfate  Patient Active Problem List    Diagnosis Date Noted   • Normal first pregnancy in third trimester 08/10/2020   • COVID-19 virus infection 2020   • Urinary tract infection affecting pregnancy 2020        Hospital Course:   20 y.o. , now para 1, was admitted from the pregnancy center for gestational hypertension and prolonged fetal decelerations, this was followed by a primary  due to non responsive fetal bradycardia at 34w3d. The baby was taken to the NICU after delivery. Patient postpartum course was unremarkable, with progressive advancement in diet , ambulation and toleration of oral analgesia. Patient without complaints today and desires discharge.      Vitals:    20 1800 20 0600 20 1800 09/15/20 0600   BP:  108/83 138/83 143/90   Pulse: 81 79 76 72   Resp: 18 18 18 18   Temp: 37.7 °C (99.8 °F) 37.2 °C (98.9 °F) 37.1 °C (98.8 °F) 36.4 °C (97.6 °F)   TempSrc: Temporal Temporal Temporal Temporal   SpO2: 92% 96% 98% 96%   Weight:       Height:           Current Facility-Administered Medications   Medication Dose   • LR infusion     • PRN oxytocin (PITOCIN) (20 Units/1000 mL) PRN for excessive uterine bleeding -  See Admin Instr  125-999 mL/hr   • carboPROST (HEMABATE) injection 250 mcg  250 mcg   • miSOPROStol (CYTOTEC) tablet 600 mcg  600 mcg   • docusate sodium (COLACE) capsule 100 mg  100 mg   • simethicone (MYLICON) chewable tab 80 mg  80 mg   • prenatal plus vitamin (STUARTNATAL 1+1) 27-1 MG tablet 1 Tab  1 Tab   • ibuprofen (MOTRIN) tablet 600 mg  600 mg   • HYDROcodone-acetaminophen (NORCO) 5-325 MG per tablet 1 Tab  1 Tab   • HYDROcodone-acetaminophen (NORCO) 5-325 MG per tablet 2 Tab  2 Tab   • metoclopramide (REGLAN) injection 10 mg  10 mg   • lactated ringers (LR) infusion     • LR infusion     • oxytocin (PITOCIN) infusion (for postpartum)   mL/hr       Exam:  Abdomen: Abdomen soft, tender to palpation over the incision and right lower quadrant. BS normal. No masses,  No organomegaly  Fundus Non Tender: no  Incision: dry and intact - some superficial ecchymosis below the incision   Perineum: perineum intact  Extremity: normal extremities, peripheral pulses and reflexes normal, no peripheral edema      Labs:  Recent Labs     20  0950 09/15/20  0521   WBC 15.2* 12.0*   RBC 4.08* 3.76*   HEMOGLOBIN 10.5* 9.9*   HEMATOCRIT 33.8* 31.5*   MCV 82.8 83.8   MCH 25.7* 26.3*   MCHC 31.1* 31.4*   RDW 44.4 46.5   PLATELETCT 284 200   MPV 10.0 9.7        Activity:   Discharge to home  Pelvic Rest x 6 weeks    Assessment:  20  admitted for GHTN and prolonged fetal decelerations, underwent primary CS secondary to fetal intolerance      normal postpartum course  Discharge Assessment: No areas of skin breakdown/redness; surgical incision intact/healing    Follow up: .1 week for incision check, 6 week postpartum follow up      Discharge Meds:   Current Outpatient Medications   Medication Sig Dispense Refill   • bisacodyl (DULCOLAX) 5 MG EC tablet Take 1 Tab by mouth every day. 60 Tab 0   • ibuprofen (MOTRIN) 600 MG Tab Take 1 Tab by mouth every 6 hours as needed (For cramping after delivery; do not give if  patient is receiving ketorolac (Toradol)). 30 Tab 0   • HYDROcodone-acetaminophen (NORCO) 5-325 MG Tab per tablet Take 2 Tabs by mouth every four hours as needed (for after delivery) for up to 20 doses. 20 Tab 0     Pt need to RT TPC or ER if any of the following occur:  Fever over 100,5  Severe abd pain  Red streaks or painful masses in the breasts  Foul smelling d/c or lochia  Heavy vaginal bleeding saturating a pad per hour  S/s of PP depression         Anil Huerta M.D.

## 2020-09-15 NOTE — DISCHARGE PLANNING
Medication reconcilliation completed. Medications delivered to patient at bedside. Patient counseled.       Yumiko Obrien   Home Medication Instructions CRUZITO:[id number]    Printed on:09/15/20 4714   Medication Information                      bisacodyl (DULCOLAX) 5 MG EC tablet  Take 1 Tab by mouth every day.             HYDROcodone-acetaminophen (NORCO) 5-325 MG Tab per tablet  Take 2 Tabs by mouth every four hours as needed (for after delivery)             ibuprofen (MOTRIN) 600 MG Tab  Take 1 Tab by mouth every 6 hours as needed (For cramping after delivery).

## 2020-09-16 NOTE — PROGRESS NOTES
Pt assessed, fundus firm, lochia light. No s/s of distress. Bonding well w/ infant. Visits NICU frequently. Medicated for pain at this time, abd inc w/ steri strips, timmy, cdi, pt passing gas, and has had bowel movement. Bruising and slight blister to INC on right side. Reviewed plan for anticipated discharge before lunch.

## 2020-09-16 NOTE — PROGRESS NOTES
Patient discharged, no further questions, FOB at bedside, meds to beds delivered and reviewed by pharmacist. Patient escorted out in wheelchair by this RN.

## 2020-09-18 ENCOUNTER — POST PARTUM (OUTPATIENT)
Dept: OBGYN | Facility: CLINIC | Age: 20
End: 2020-09-18
Payer: MEDICAID

## 2020-09-18 VITALS — DIASTOLIC BLOOD PRESSURE: 82 MMHG | BODY MASS INDEX: 31.02 KG/M2 | SYSTOLIC BLOOD PRESSURE: 118 MMHG | WEIGHT: 180.8 LBS

## 2020-09-18 DIAGNOSIS — Z98.891 STATUS POST CESAREAN SECTION: ICD-10-CM

## 2020-09-18 PROCEDURE — 0503F POSTPARTUM CARE VISIT: CPT | Performed by: OBSTETRICS & GYNECOLOGY

## 2020-09-18 ASSESSMENT — FIBROSIS 4 INDEX: FIB4 SCORE: 0.58

## 2020-09-18 NOTE — PROGRESS NOTES
Pt. here for C/S check delivered on 9/11/2020  Currently :bottle feeding  Pt. States Pt states some pain on her incision   Post partum visit will be scheduled when she leaves today  Chaperone offered not indicated

## 2020-09-18 NOTE — PROGRESS NOTES
Incision Check    CC: s/p c/s incision check    HPI: 20 y.o.  s/p  delivery on 20 with Dr. Bustamante for nonreassuring fetal heart rate tracing not in labor here for incision check.   Pt reports doing well with minimal pain.  No fevers.  Denies trouble with urination or BM.  Minimal vaginal bleeding.    Pumping as baby is in NICU     Denies concerns about mood.     LMP 2020   Gen: AAO, NAD  Abd: soft, NT, ND, incision C/D/I, healing well    A/P: 20 y.o.  s/p c/s on 20 doing well  - no signs of postop complications  - encouraged BFing, lactation visit prn  - no signs of PP depression  - contraception: given brochures for IUDs and Nexplanon      RTC for routine postpartum visit in approx 3-4wks

## 2020-10-16 ENCOUNTER — HOSPITAL ENCOUNTER (OUTPATIENT)
Facility: MEDICAL CENTER | Age: 20
End: 2020-10-16
Attending: NURSE PRACTITIONER
Payer: MEDICAID

## 2020-10-16 ENCOUNTER — POST PARTUM (OUTPATIENT)
Dept: OBGYN | Facility: CLINIC | Age: 20
End: 2020-10-16
Payer: MEDICAID

## 2020-10-16 VITALS — WEIGHT: 181 LBS | SYSTOLIC BLOOD PRESSURE: 110 MMHG | DIASTOLIC BLOOD PRESSURE: 68 MMHG | BODY MASS INDEX: 31.05 KG/M2

## 2020-10-16 PROBLEM — Z34.03 NORMAL FIRST PREGNANCY IN THIRD TRIMESTER: Status: RESOLVED | Noted: 2020-08-10 | Resolved: 2020-10-16

## 2020-10-16 PROCEDURE — 88175 CYTOPATH C/V AUTO FLUID REDO: CPT

## 2020-10-16 PROCEDURE — 90050 PR POSTPARTUM VISIT: CPT | Performed by: NURSE PRACTITIONER

## 2020-10-16 PROCEDURE — 87491 CHLMYD TRACH DNA AMP PROBE: CPT

## 2020-10-16 PROCEDURE — 87591 N.GONORRHOEAE DNA AMP PROB: CPT

## 2020-10-16 ASSESSMENT — ENCOUNTER SYMPTOMS
EYES NEGATIVE: 1
NEUROLOGICAL NEGATIVE: 1
CONSTITUTIONAL NEGATIVE: 1
GASTROINTESTINAL NEGATIVE: 1
RESPIRATORY NEGATIVE: 1
CARDIOVASCULAR NEGATIVE: 1
PSYCHIATRIC NEGATIVE: 1
MUSCULOSKELETAL NEGATIVE: 1

## 2020-10-16 ASSESSMENT — EDINBURGH POSTNATAL DEPRESSION SCALE (EPDS)
I HAVE BEEN SO UNHAPPY THAT I HAVE HAD DIFFICULTY SLEEPING: NOT AT ALL
I HAVE BLAMED MYSELF UNNECESSARILY WHEN THINGS WENT WRONG: NO, NEVER
TOTAL SCORE: 0
I HAVE FELT SCARED OR PANICKY FOR NO GOOD REASON: NO, NOT AT ALL
THINGS HAVE BEEN GETTING ON TOP OF ME: NO, I HAVE BEEN COPING AS WELL AS EVER
I HAVE BEEN SO UNHAPPY THAT I HAVE BEEN CRYING: NO, NEVER
I HAVE FELT SAD OR MISERABLE: NO, NOT AT ALL
THE THOUGHT OF HARMING MYSELF HAS OCCURRED TO ME: NEVER
I HAVE BEEN ABLE TO LAUGH AND SEE THE FUNNY SIDE OF THINGS: AS MUCH AS I ALWAYS COULD
I HAVE BEEN ANXIOUS OR WORRIED FOR NO GOOD REASON: NO, NOT AT ALL
I HAVE LOOKED FORWARD WITH ENJOYMENT TO THINGS: AS MUCH AS I EVER DID

## 2020-10-16 ASSESSMENT — FIBROSIS 4 INDEX: FIB4 SCORE: 0.58

## 2020-10-16 NOTE — PROGRESS NOTES
Subjective:    Yumiko Obrien is a 20 y.o. female who presents for her postpartum exam 5 weeks following primary  at 34 weeks for fetal intolerance to labour. Her prenatal course was complicated by gHTN, Covid + status. She denies dysuria, vaginal bleeding, odor, itching or breast problems. She is Bottlefeeding. She desires condoms for her birth control method. Reports twice sex prior to this appointment. Eating a regular diet without difficulty. Bowel movement are Normal.  The patient is not having any pain. Spotting. Patient Denies Incisional pain, drainage or redness. Patient denies  postpartum depression but does have some sadness at times because baby still in NICU working on feeding.  Review of Systems   Constitutional: Negative.    HENT: Negative.    Eyes: Negative.    Respiratory: Negative.    Cardiovascular: Negative.    Gastrointestinal: Negative.    Genitourinary: Negative.    Musculoskeletal: Negative.    Skin: Negative.    Neurological: Negative.    Endo/Heme/Allergies: Negative.    Psychiatric/Behavioral: Negative.    All other systems reviewed and are negative.      Problem List     Patient Active Problem List    Diagnosis Date Noted   • Normal first pregnancy in third trimester 08/10/2020   • COVID-19 virus infection 2020   • Urinary tract infection affecting pregnancy 2020       Objective  Physical Exam   Constitutional: She is oriented to person, place, and time and well-developed, well-nourished, and in no distress.   HENT:   Head: Normocephalic and atraumatic.   Nose: Nose normal.   Eyes: Pupils are equal, round, and reactive to light. Conjunctivae and EOM are normal.   Neck: Normal range of motion. Neck supple. No thyromegaly present.   Cardiovascular: Normal rate, regular rhythm, normal heart sounds and intact distal pulses.   Pulmonary/Chest: Effort normal and breath sounds normal. No respiratory distress.   Abdominal: Soft. Bowel sounds are normal. She exhibits no  distension.   Genitourinary:    Vagina and cervix normal.     Musculoskeletal: Normal range of motion.         General: No edema.   Neurological: She is alert and oriented to person, place, and time. Gait normal.   Skin: Skin is warm and dry.   Psychiatric: Mood, memory, affect and judgment normal.   Nursing note and vitals reviewed.      See PE    /68   Wt 82.1 kg (181 lb)   LMP 2020   BMI 31.05 kg/m²     Assessment:    1. PP care  2. Exam WNL   3. Pap due  4. Desires contraception       Plan:    1. Pap collected today  2. Continue PNV   3. Contraceptive counseling - Discussed with pt today all forms of birth control, pros cons and alternatives.  Undecided at this time but may desire the Nexplanon.  Will call for appt  4. Encouraged condom use  Until Nexplanon and for STI prevention  5. Discussed diet, exercise and resumption of sexual activity   6. Preconception guidance for next pregnancy if applicable. gHTN as well as hx of  risk factors. Folic acid for all women of childbearing age.   7. Discussed pregnancy spacing d/t hx of   8. Well woman exam yearly

## 2020-10-16 NOTE — PROGRESS NOTES
Pt here today for postpartum exam,delivery type c/s on 9/11/2020  Currently Bottle feeding  BCM: unknown, [facility].   LMP: not yet  Good ph:[phone]  Last pap smear Today  Chaperone offered and indicated

## 2020-10-19 LAB
C TRACH DNA GENITAL QL NAA+PROBE: NEGATIVE
CYTOLOGY REG CYTOL: NORMAL
N GONORRHOEA DNA GENITAL QL NAA+PROBE: NEGATIVE
SPECIMEN SOURCE: NORMAL

## 2021-04-14 ENCOUNTER — GYNECOLOGY VISIT (OUTPATIENT)
Dept: OBGYN | Facility: CLINIC | Age: 21
End: 2021-04-14
Payer: MEDICAID

## 2021-04-14 VITALS
WEIGHT: 177 LBS | BODY MASS INDEX: 31.36 KG/M2 | DIASTOLIC BLOOD PRESSURE: 78 MMHG | SYSTOLIC BLOOD PRESSURE: 116 MMHG | HEIGHT: 63 IN

## 2021-04-14 DIAGNOSIS — Z30.017 NEXPLANON INSERTION: ICD-10-CM

## 2021-04-14 PROBLEM — Z86.16 HISTORY OF COVID-19: Status: ACTIVE | Noted: 2021-04-14

## 2021-04-14 LAB
INT CON NEG: NEGATIVE
INT CON POS: POSITIVE
POC URINE PREGNANCY TEST: NEGATIVE

## 2021-04-14 PROCEDURE — 81025 URINE PREGNANCY TEST: CPT | Performed by: NURSE PRACTITIONER

## 2021-04-14 PROCEDURE — 11981 INSERTION DRUG DLVR IMPLANT: CPT | Performed by: NURSE PRACTITIONER

## 2021-04-14 ASSESSMENT — FIBROSIS 4 INDEX: FIB4 SCORE: 0.58

## 2021-04-14 NOTE — PROGRESS NOTES
Today I discussed with the patient subdermal implant/Nexplanon.   Pt reports no unprotected sex in past two weeks. Urine pregnancy test negative today.   We discussed its mechanism of action and how it prevents pregnancy  Discussed that the implant is a progesterone only form of contraception.  Also discussed with patient risks associated with placement of the device which can include infection, bruising and pain. We also discussed the possibility of the device can be displaced. We discussed that the device would be palpable under the skin of the arm. Pt should check that the implant is where it should be each month at least.   I also discussed with the patient side effects of the device which can include but are not limited to, irregular bleeding which can include amenorrhea, irregular spotting and also worsening of menstrual cycles. There is an increased risk of headaches related to the device and potential for weight gain.  After thorough discussion the patient had opportunity to ask questions regarding the device and at this time desires the device to be placed.     Patient information handout is given for the device.      Return to Clinic for:  Pain, redness or bleeding at the insertion site   Any purulent drainage (puss)  Heavy bleeding (saturating a pad every 1-2 hours) that lasts more than 1-2 days   Reviewed ability to control prolonged bleeding with other pharmacological means   Follow up in 4 - 6 weeks for post insertion check

## 2021-04-14 NOTE — PROCEDURES
Procedure note: Nexplanon insertion:     Informed consent obtained, consent signed-discussed the risks of Nexplanon; pain, bleeding, infection, bruising, possible pregnancy, difficulty with removing implant, possible scarring to arm.   All the risks and benefits of the procedure and the device are explained and patient verbalizes understanding and signs the consent     Urine hCG negative    Patient positioned supine with nondominant arm exposed.    Medial epicondyle of left arm palpated    Surgical enedina placed 8-10 cm medial to the medial epicondyle    Betadine prep the skin    Local anesthesia-1% lidocaine injected using a 1 1/2 inch 27 gauge needle     Implant inserted via the Nexplanon insertion device subdermally in a sterile fashion    The patient and provider can feel the implant under the skin and the blue end of the insertion device is present indicating implant insertion    Steristrip and sterile 4x4's     Pressure bandage placed    Patient instructed to remove dressing  in 24 hours    Patient instructed to use a band-aid for 2 days there after    Patient tolerated the procedure well    Followup in 4-6  weeks for post insertion checkup    Lot #[id number]  Exp: 7/1/2023

## 2021-04-14 NOTE — NON-PROVIDER
Patient here for a GYN follow up.   Last seen on 10/16/20   C/o Nexplanon insertion  UPT Negative  pharmacy verified.  [phone] (home)

## 2021-05-05 ENCOUNTER — TELEPHONE (OUTPATIENT)
Dept: SCHEDULING | Facility: IMAGING CENTER | Age: 21
End: 2021-05-05

## 2021-05-06 ENCOUNTER — OFFICE VISIT (OUTPATIENT)
Dept: MEDICAL GROUP | Facility: MEDICAL CENTER | Age: 21
End: 2021-05-06
Attending: INTERNAL MEDICINE
Payer: MEDICAID

## 2021-05-06 VITALS
OXYGEN SATURATION: 95 % | TEMPERATURE: 97.9 F | WEIGHT: 177 LBS | DIASTOLIC BLOOD PRESSURE: 78 MMHG | HEART RATE: 90 BPM | BODY MASS INDEX: 31.36 KG/M2 | SYSTOLIC BLOOD PRESSURE: 118 MMHG | RESPIRATION RATE: 16 BRPM | HEIGHT: 63 IN

## 2021-05-06 DIAGNOSIS — Z97.5 IMPLANON IN PLACE: ICD-10-CM

## 2021-05-06 DIAGNOSIS — H61.23 HEARING LOSS OF BOTH EARS DUE TO CERUMEN IMPACTION: ICD-10-CM

## 2021-05-06 PROCEDURE — 69209 REMOVE IMPACTED EAR WAX UNI: CPT | Performed by: INTERNAL MEDICINE

## 2021-05-06 PROCEDURE — 99203 OFFICE O/P NEW LOW 30 MIN: CPT | Performed by: INTERNAL MEDICINE

## 2021-05-06 PROCEDURE — 99213 OFFICE O/P EST LOW 20 MIN: CPT | Mod: 25 | Performed by: INTERNAL MEDICINE

## 2021-05-06 ASSESSMENT — FIBROSIS 4 INDEX: FIB4 SCORE: 0.58

## 2021-05-06 ASSESSMENT — PATIENT HEALTH QUESTIONNAIRE - PHQ9: CLINICAL INTERPRETATION OF PHQ2 SCORE: 0

## 2021-05-06 NOTE — PROGRESS NOTES
Yumiko Obrien is a 20 y.o. female here for right sided hearing loss, est care  HPI:    Hearing loss of both ears due to cerumen impaction  She reports difficulty hearing out of her right ear specifically per the past few days.  States that she can barely hear anything on that side.  She denies any pain or drainage from the ear.  No recent upper respiratory infection.  Does not typically use Q-tips and has not used any recently.    Current medicines (including changes today)  No current outpatient medications on file.     No current facility-administered medications for this visit.     She  has a past medical history of Hypertension.  She  has a past surgical history that includes pr  delivery only (2020); abdominal exploration; cholecystectomy; and primary c section.  Social History     Tobacco Use   • Smoking status: Never Smoker   • Smokeless tobacco: Never Used   Substance Use Topics   • Alcohol use: Not Currently   • Drug use: Not Currently     Social History     Social History Narrative   • Not on file     Family History   Problem Relation Age of Onset   • No Known Problems Mother    • No Known Problems Father    • No Known Problems Sister    • No Known Problems Brother    • Diabetes Maternal Grandmother    • Cancer Neg Hx    • Heart Disease Neg Hx    • Stroke Neg Hx    • Hypertension Neg Hx    • Hyperlipidemia Neg Hx          ROS  As above in HPI  All other systems reviewed and are negative     Objective:     Vitals:    21 1238   BP: 118/78   Pulse: 90   Resp: 16   Temp: 36.6 °C (97.9 °F)   SpO2: 95%     Body mass index is 31.36 kg/m².  Physical Exam:    Constitutional: Alert, no distress.  Skin: Warm, dry, good turgor, no rashes in visible areas.  Eye: Equal, round and reactive, conjunctiva clear, lids normal.  ENMT: Lips without lesions, good dentition, oropharynx clear, b/l TM's completely obscured with wax  Neck: Trachea midline, no masses, no thyromegaly. No cervical or supraclavicular  lymphadenopathy.  Respiratory: Unlabored respiratory effort, lungs clear to auscultation, no wheezes, no ronchi.  Cardiovascular: Regular rate and rhythm, no murmurs appreciated, no lower extremity edema.  Abdomen: Soft, non-tender, no masses, no hepatosplenomegaly.  Psych: Alert and oriented x3, normal affect and mood.        Assessment and Plan:   The following treatment plan was discussed    1. Hearing loss of both ears due to cerumen impaction  Medical assistant performed ear lavage bilaterally on patient today.  See separate note for details.  Procedure was successful and patient reported hearing was back to normal.  We discussed avoiding Q-tips, over-the-counter Debrox drops if needed, and she can always come back for another ear lavage if she is experiencing the symptoms again.    We discussed her health maintenance.  She is up-to-date with her Pap smear.  She does show several vaccinations due however [place] and most likely had these as a child.    Followup: Return in about 1 year (around 5/6/2022), or if symptoms worsen or fail to improve.

## 2021-05-06 NOTE — ASSESSMENT & PLAN NOTE
She reports difficulty hearing out of her right ear specifically per the past few days.  States that she can barely hear anything on that side.  She denies any pain or drainage from the ear.  No recent upper respiratory infection.  Does not typically use Q-tips and has not used any recently.

## 2021-05-06 NOTE — PROGRESS NOTES
Pt present to the [facility] to establish care with Dr Steel, Dr Steel Advised after meeting with the patient that she would like to have a bilateral ear lavage done. Please be aware that the patient presented to Dr Steel to establish care. Bilateral ear lavage was conducted as a result of the establishing visit. This Patient did not present to have just an ear lavage conducted.

## 2021-05-20 ENCOUNTER — GYNECOLOGY VISIT (OUTPATIENT)
Dept: OBGYN | Facility: CLINIC | Age: 21
End: 2021-05-20
Payer: MEDICAID

## 2021-05-20 VITALS
BODY MASS INDEX: 31.54 KG/M2 | DIASTOLIC BLOOD PRESSURE: 72 MMHG | HEIGHT: 63 IN | WEIGHT: 178 LBS | SYSTOLIC BLOOD PRESSURE: 122 MMHG

## 2021-05-20 DIAGNOSIS — Z97.5 NEXPLANON IN PLACE: ICD-10-CM

## 2021-05-20 PROCEDURE — 99212 OFFICE O/P EST SF 10 MIN: CPT | Performed by: NURSE PRACTITIONER

## 2021-05-20 ASSESSMENT — FIBROSIS 4 INDEX: FIB4 SCORE: 0.58

## 2021-05-20 NOTE — NON-PROVIDER
[facility]   [facility] [phone]  Pharmacy verified   C/o no concerns

## 2021-05-20 NOTE — PROGRESS NOTES
Pt here for implant follow up. It was placed on 4/14 and since then she had one lighter period. No other issues to report.    Implant palpated in pts lefts arm in proper location    Reviewed normalcy of lighter periods, no period or irregular spotting for up to 6 months on implant  Reviewed need to check device's location in each month  RTC in one year for annual/implant check up

## 2022-02-10 LAB
ALBUMIN SERPL BCP-MCNC: 4.7 G/DL (ref 3.2–4.9)
ALBUMIN/GLOB SERPL: 1.5 G/DL
ALP SERPL-CCNC: 114 U/L (ref 30–99)
ALT SERPL-CCNC: 17 U/L (ref 2–50)
ANION GAP SERPL CALC-SCNC: 12 MMOL/L (ref 7–16)
AST SERPL-CCNC: 18 U/L (ref 12–45)
BASOPHILS # BLD AUTO: 0.4 % (ref 0–1.8)
BASOPHILS # BLD: 0.05 K/UL (ref 0–0.12)
BILIRUB SERPL-MCNC: 0.4 MG/DL (ref 0.1–1.5)
BUN SERPL-MCNC: 9 MG/DL (ref 8–22)
CALCIUM SERPL-MCNC: 9.6 MG/DL (ref 8.5–10.5)
CHLORIDE SERPL-SCNC: 105 MMOL/L (ref 96–112)
CO2 SERPL-SCNC: 22 MMOL/L (ref 20–33)
CREAT SERPL-MCNC: 0.46 MG/DL (ref 0.5–1.4)
EOSINOPHIL # BLD AUTO: 0.45 K/UL (ref 0–0.51)
EOSINOPHIL NFR BLD: 4 % (ref 0–6.9)
ERYTHROCYTE [DISTWIDTH] IN BLOOD BY AUTOMATED COUNT: 41.1 FL (ref 35.9–50)
GLOBULIN SER CALC-MCNC: 3.1 G/DL (ref 1.9–3.5)
GLUCOSE SERPL-MCNC: 107 MG/DL (ref 65–99)
HCG SERPL QL: NEGATIVE
HCT VFR BLD AUTO: 40.7 % (ref 37–47)
HGB BLD-MCNC: 12.8 G/DL (ref 12–16)
IMM GRANULOCYTES # BLD AUTO: 0.04 K/UL (ref 0–0.11)
IMM GRANULOCYTES NFR BLD AUTO: 0.4 % (ref 0–0.9)
LIPASE SERPL-CCNC: 22 U/L (ref 11–82)
LYMPHOCYTES # BLD AUTO: 2.42 K/UL (ref 1–4.8)
LYMPHOCYTES NFR BLD: 21.7 % (ref 22–41)
MCH RBC QN AUTO: 25.3 PG (ref 27–33)
MCHC RBC AUTO-ENTMCNC: 31.4 G/DL (ref 33.6–35)
MCV RBC AUTO: 80.4 FL (ref 81.4–97.8)
MONOCYTES # BLD AUTO: 0.83 K/UL (ref 0–0.85)
MONOCYTES NFR BLD AUTO: 7.5 % (ref 0–13.4)
NEUTROPHILS # BLD AUTO: 7.34 K/UL (ref 2–7.15)
NEUTROPHILS NFR BLD: 66 % (ref 44–72)
NRBC # BLD AUTO: 0 K/UL
NRBC BLD-RTO: 0 /100 WBC
PLATELET # BLD AUTO: 310 K/UL (ref 164–446)
PMV BLD AUTO: 9.4 FL (ref 9–12.9)
POTASSIUM SERPL-SCNC: 3.8 MMOL/L (ref 3.6–5.5)
PROT SERPL-MCNC: 7.8 G/DL (ref 6–8.2)
RBC # BLD AUTO: 5.06 M/UL (ref 4.2–5.4)
SODIUM SERPL-SCNC: 139 MMOL/L (ref 135–145)
WBC # BLD AUTO: 11.1 K/UL (ref 4.8–10.8)

## 2022-02-10 PROCEDURE — 84703 CHORIONIC GONADOTROPIN ASSAY: CPT

## 2022-02-10 PROCEDURE — 85025 COMPLETE CBC W/AUTO DIFF WBC: CPT

## 2022-02-10 PROCEDURE — 83690 ASSAY OF LIPASE: CPT

## 2022-02-10 PROCEDURE — 80053 COMPREHEN METABOLIC PANEL: CPT

## 2022-02-10 ASSESSMENT — FIBROSIS 4 INDEX: FIB4 SCORE: 0.61

## 2022-02-11 ENCOUNTER — HOSPITAL ENCOUNTER (EMERGENCY)
Facility: MEDICAL CENTER | Age: 22
End: 2022-02-11
Attending: EMERGENCY MEDICINE
Payer: COMMERCIAL

## 2022-02-11 ENCOUNTER — APPOINTMENT (OUTPATIENT)
Dept: RADIOLOGY | Facility: MEDICAL CENTER | Age: 22
End: 2022-02-11
Attending: EMERGENCY MEDICINE
Payer: COMMERCIAL

## 2022-02-11 VITALS
OXYGEN SATURATION: 97 % | HEIGHT: 63 IN | TEMPERATURE: 98 F | BODY MASS INDEX: 31.8 KG/M2 | DIASTOLIC BLOOD PRESSURE: 68 MMHG | SYSTOLIC BLOOD PRESSURE: 107 MMHG | HEART RATE: 84 BPM | RESPIRATION RATE: 16 BRPM | WEIGHT: 179.45 LBS

## 2022-02-11 DIAGNOSIS — N39.0 ACUTE URINARY TRACT INFECTION: ICD-10-CM

## 2022-02-11 DIAGNOSIS — K59.00 CONSTIPATION, UNSPECIFIED CONSTIPATION TYPE: ICD-10-CM

## 2022-02-11 LAB
APPEARANCE UR: ABNORMAL
BACTERIA #/AREA URNS HPF: ABNORMAL /HPF
BILIRUB UR QL STRIP.AUTO: NEGATIVE
COLOR UR: YELLOW
EPI CELLS #/AREA URNS HPF: ABNORMAL /HPF
GLUCOSE UR STRIP.AUTO-MCNC: NEGATIVE MG/DL
HYALINE CASTS #/AREA URNS LPF: ABNORMAL /LPF
KETONES UR STRIP.AUTO-MCNC: 15 MG/DL
LEUKOCYTE ESTERASE UR QL STRIP.AUTO: ABNORMAL
MICRO URNS: ABNORMAL
NITRITE UR QL STRIP.AUTO: POSITIVE
PH UR STRIP.AUTO: 5.5 [PH] (ref 5–8)
PROT UR QL STRIP: NEGATIVE MG/DL
RBC # URNS HPF: ABNORMAL /HPF
RBC UR QL AUTO: ABNORMAL
SP GR UR STRIP.AUTO: 1.02
UROBILINOGEN UR STRIP.AUTO-MCNC: 0.2 MG/DL
WBC #/AREA URNS HPF: ABNORMAL /HPF

## 2022-02-11 PROCEDURE — 87077 CULTURE AEROBIC IDENTIFY: CPT

## 2022-02-11 PROCEDURE — 700102 HCHG RX REV CODE 250 W/ 637 OVERRIDE(OP): Performed by: EMERGENCY MEDICINE

## 2022-02-11 PROCEDURE — 81001 URINALYSIS AUTO W/SCOPE: CPT

## 2022-02-11 PROCEDURE — 99284 EMERGENCY DEPT VISIT MOD MDM: CPT

## 2022-02-11 PROCEDURE — 87086 URINE CULTURE/COLONY COUNT: CPT

## 2022-02-11 PROCEDURE — A9270 NON-COVERED ITEM OR SERVICE: HCPCS | Performed by: EMERGENCY MEDICINE

## 2022-02-11 PROCEDURE — 74018 RADEX ABDOMEN 1 VIEW: CPT

## 2022-02-11 PROCEDURE — 87186 SC STD MICRODIL/AGAR DIL: CPT

## 2022-02-11 RX ORDER — BISACODYL 5 MG
10 TABLET, DELAYED RELEASE (ENTERIC COATED) ORAL ONCE
Status: COMPLETED | OUTPATIENT
Start: 2022-02-11 | End: 2022-02-11

## 2022-02-11 RX ORDER — CEPHALEXIN 500 MG/1
500 CAPSULE ORAL ONCE
Status: COMPLETED | OUTPATIENT
Start: 2022-02-11 | End: 2022-02-11

## 2022-02-11 RX ORDER — CEPHALEXIN 500 MG/1
500 CAPSULE ORAL 4 TIMES DAILY
Qty: 40 CAPSULE | Refills: 0 | Status: SHIPPED | OUTPATIENT
Start: 2022-02-11 | End: 2022-04-14

## 2022-02-11 RX ADMIN — MAGNESIUM CITRATE 296 ML: 1.75 LIQUID ORAL at 03:40

## 2022-02-11 RX ADMIN — CEPHALEXIN 500 MG: 500 CAPSULE ORAL at 03:40

## 2022-02-11 RX ADMIN — BISACODYL 10 MG: 5 TABLET, COATED ORAL at 03:40

## 2022-02-11 NOTE — ED TRIAGE NOTES
"Chief Complaint   Patient presents with   • Abdominal Pain     Pt complains of generalized abdominal pain x2 days. \"My stomach hurts when I laugh, move around, and pee.\" Pt states she hasn't had a bowel movement since pain started.     Pt educated upon triage process and told to inform  staff of any changes in condition so that Pt may be reassessed. No further questions at this time. Pt sitting out in lobby.    "

## 2022-02-11 NOTE — ED NOTES
Patient provided with discharge instructions. Education complete, all questions answered.   Vitals stable. All personal belongings accounted for.   Patient ambulated out of the ER.

## 2022-02-11 NOTE — DISCHARGE INSTRUCTIONS
Make sure you are drinking plenty of fluids especially water and water based products, cranberry juice for the next 2 to 3 days  High-fiber diet i.e. bran cereal, bran muffins, raw vegetables  Always wipe from front to back, void before and after intercourse, cotton underwear  Take the antibiotics until completely gone  Laxatives for the next 1 to 2 days until your pain is gone  Follow-up with your doctor in 1 week for urine recheck.

## 2022-02-11 NOTE — ED NOTES
KATYA Preciado, Rounded with patient in waiting room. Vital signs rechecked. All questions answered. Apologized for wait time. No further needs at this time.

## 2022-02-11 NOTE — ED PROVIDER NOTES
"ED Provider Note     Scribed for Magnolia Cano D.O. by Desirae Nguyen. 2022, 1:37 AM.     Primary care provider: Adelita Steel M.D.  Means of arrival: walk in         History obtained from: patient  History limited by: none noted    CHIEF COMPLAINT  Chief Complaint   Patient presents with   • Abdominal Pain     Pt complains of generalized abdominal pain x2 days. \"My stomach hurts when I laugh, move around, and pee.\" Pt states she hasn't had a bowel movement since pain started.       HPI  Yumiko Obrien is a 21 y.o. female who presents to the emergency Department with abdominal pain onset . The patient reports she has been constipated since the pain started and the pain is exacerbated with movement and urination. She denies any diarrhea, vomiting, dysuria, or fevers. She denies any chance of pregnancy.    REVIEW OF SYSTEMS  Pertinent positives include abdominal pain and constipation. Pertinent negatives include no diarrhea, vomiting, dysuria, or fevers.   See HPI for further details. All other systems are negative.    PAST MEDICAL HISTORY  Past Medical History:   Diagnosis Date   • Hypertension        FAMILY HISTORY  Family History   Problem Relation Age of Onset   • No Known Problems Mother    • No Known Problems Father    • No Known Problems Sister    • No Known Problems Brother    • Diabetes Maternal Grandmother    • Cancer Neg Hx    • Heart Disease Neg Hx    • Stroke Neg Hx    • Hypertension Neg Hx    • Hyperlipidemia Neg Hx        SOCIAL HISTORY  Social History     Tobacco Use   • Smoking status: Never Smoker   • Smokeless tobacco: Never Used   Vaping Use   • Vaping Use: Never used   Substance Use Topics   • Alcohol use: Not Currently   • Drug use: Not Currently      Social History     Substance and Sexual Activity   Drug Use Not Currently       SURGICAL HISTORY  Past Surgical History:   Procedure Laterality Date   • PB  DELIVERY ONLY  2020    Procedure:  SECTION, PRIMARY;  " "Surgeon: Wade Bustamante M.D.;  Location: LABOR AND DELIVERY;  Service: Gynecology   • ABDOMINAL EXPLORATION     • CHOLECYSTECTOMY     • PRIMARY C SECTION         CURRENT MEDICATIONS  No current facility-administered medications for this encounter.  No current outpatient medications noted.     ALLERGIES  Allergies   Allergen Reactions   • Sulfa Drugs    • Sulfate        PHYSICAL EXAM  VITAL SIGNS: /74   Pulse 89   Temp 36.6 °C (97.9 °F) (Temporal)   Resp 16   Ht 1.6 m (5' 3\")   Wt 81.4 kg (179 lb 7.3 oz)   SpO2 95%   BMI 31.79 kg/m²     Constitutional: Patient is well developed, well nourished. Non-toxic appearing. Mild distress.   HENT: Normocephalic, . Nose normal with no mucosal edema or drainage. Oropharynx moist without erythema or exudates.  Eyes: PERRL, EOMI  Cardiovascular: Normal heart rate and Regular rhythm. No murmur.  Thorax & Lungs: Clear and equal breath sounds with good excursion. No respiratory distress.  Abdomen: Mildly tender in mid abdomen and epigastric region, Hyperactive bowel sounds. Soft, no rebound , guarding, palpable masses.   Skin: Warm, Dry  Back: No cervical, thoracic, or lumbosacral tenderness. No CVA tenderness.    Extremities: Peripheral pulses 4/4    Musculoskeletal: Normal range of motion in all major joints. No tenderness to palpation or major deformities noted.   Neurologic: Alert & oriented x 3, Normal motor function, Normal sensory function, No lateralizing or focal deficits noted. DTR's 4/4 bilaterally.  Psychiatric: Affect normal, Judgment normal, Mood normal.    DIAGNOSTICS/PROCEDURES    LABS  Results for orders placed or performed during the hospital encounter of 02/11/22   CBC WITH DIFFERENTIAL   Result Value Ref Range    WBC 11.1 (H) 4.8 - 10.8 K/uL    RBC 5.06 4.20 - 5.40 M/uL    Hemoglobin 12.8 12.0 - 16.0 g/dL    Hematocrit 40.7 37.0 - 47.0 %    MCV 80.4 (L) 81.4 - 97.8 fL    MCH 25.3 (L) 27.0 - 33.0 pg    MCHC 31.4 (L) 33.6 - 35.0 g/dL    RDW 41.1 35.9 " - 50.0 fL    Platelet Count 310 164 - 446 K/uL    MPV 9.4 9.0 - 12.9 fL    Neutrophils-Polys 66.00 44.00 - 72.00 %    Lymphocytes 21.70 (L) 22.00 - 41.00 %    Monocytes 7.50 0.00 - 13.40 %    Eosinophils 4.00 0.00 - 6.90 %    Basophils 0.40 0.00 - 1.80 %    Immature Granulocytes 0.40 0.00 - 0.90 %    Nucleated RBC 0.00 /100 WBC    Neutrophils (Absolute) 7.34 (H) 2.00 - 7.15 K/uL    Lymphs (Absolute) 2.42 1.00 - 4.80 K/uL    Monos (Absolute) 0.83 0.00 - 0.85 K/uL    Eos (Absolute) 0.45 0.00 - 0.51 K/uL    Baso (Absolute) 0.05 0.00 - 0.12 K/uL    Immature Granulocytes (abs) 0.04 0.00 - 0.11 K/uL    NRBC (Absolute) 0.00 K/uL   COMP METABOLIC PANEL   Result Value Ref Range    Sodium 139 135 - 145 mmol/L    Potassium 3.8 3.6 - 5.5 mmol/L    Chloride 105 96 - 112 mmol/L    Co2 22 20 - 33 mmol/L    Anion Gap 12.0 7.0 - 16.0    Glucose 107 (H) 65 - 99 mg/dL    Bun 9 8 - 22 mg/dL    Creatinine 0.46 (L) 0.50 - 1.40 mg/dL    Calcium 9.6 8.5 - 10.5 mg/dL    AST(SGOT) 18 12 - 45 U/L    ALT(SGPT) 17 2 - 50 U/L    Alkaline Phosphatase 114 (H) 30 - 99 U/L    Total Bilirubin 0.4 0.1 - 1.5 mg/dL    Albumin 4.7 3.2 - 4.9 g/dL    Total Protein 7.8 6.0 - 8.2 g/dL    Globulin 3.1 1.9 - 3.5 g/dL    A-G Ratio 1.5 g/dL   LIPASE   Result Value Ref Range    Lipase 22 11 - 82 U/L   HCG QUAL SERUM   Result Value Ref Range    Beta-Hcg Qualitative Serum Negative Negative   URINALYSIS,CULTURE IF INDICATED    Specimen: Urine   Result Value Ref Range    Color Yellow     Character Cloudy (A)     Specific Gravity 1.018 <1.035    Ph 5.5 5.0 - 8.0    Glucose Negative Negative mg/dL    Ketones 15 (A) Negative mg/dL    Protein Negative Negative mg/dL    Bilirubin Negative Negative    Urobilinogen, Urine 0.2 Negative    Nitrite Positive (A) Negative    Leukocyte Esterase Moderate (A) Negative    Occult Blood Small (A) Negative    Micro Urine Req Microscopic    ESTIMATED GFR   Result Value Ref Range    GFR If African American >60 >60 mL/min/1.73 m 2    GFR  If Non African American >60 >60 mL/min/1.73 m 2   URINE MICROSCOPIC (W/UA)   Result Value Ref Range    WBC  (A) /hpf    RBC 0-2 /hpf    Bacteria Many (A) None /hpf    Epithelial Cells Moderate (A) /hpf    Hyaline Cast 6-10 (A) /lpf   URINE CULTURE(NEW)    Specimen: Urine   Result Value Ref Range    Significant Indicator NEG     Source UR     Site -     Culture Result -      Labs reviewed by me      RADIOLOGY/PROCEDURES  [id number] VIEW   Final Result         1.  Moderate stool in the colon suggests changes of constipation, otherwise nonspecific bowel gas pattern   2.  Hepatomegaly        Results and radiologist interpretation reviewed by me.     COURSE & MEDICAL DECISION MAKING  Pertinent Labs & Imaging studies reviewed. (See chart for details)    1:37 AM - Patient seen and evaluated at bedside. Ordered for DX-Abdomen, CBC w/ Differential, CMP, Lipase, HCG Qual Serum, UA Culture, and Urine Culture to evaluate. Differential diagnoses include, but are not limited to, UTI, Constipation, Bowel Obstruction.     Labs reveal a normal white blood cell count.  Urinalysis is grossly positive for urinary tract infection with positive nitrites, many bacteria  white cells.  KUB shows large quantity of stool consistent with constipation.  Patient was given Keflex, magnesium citrate and Dulcolax here.    3:22 AM - Patient was reevaluated at bedside. Discussed lab and radiology results with the patient and informed them that they have a UTI and will receive a prescription to treat the infection. Advised patient to stay hydrated. ED return precautions discussed. Patient was given the opportunity to ask questions and verbalizes agreement to the plan of care.   She is instructed to increase water and fiber in her diet, always wipe from front to back, void before and after intercourse.  She is to do cranberry juice and increase fluids for the next 3 to 4 days follow-up with her primary care doctor for urine recheck in 1  week.  She is discharged in stable and improved condition.     The patient will return for new or worsening symptoms and is stable at the time of discharge.      DISPOSITION:  Patient will be discharged home in stable condition.    FOLLOW UP:  Adelita Steel M.D.  [address] [place] [id number]  [phone]    In 1 week        OUTPATIENT MEDICATIONS:  New Prescriptions    CEPHALEXIN (KEFLEX) 500 MG CAP    Take 1 Capsule by mouth 4 times a day.         FINAL IMPRESSION  1. Constipation, unspecified constipation type    2. Acute urinary tract infection         Desirae CREWS (Scribe), am scribing for, and in the presence of, Magnolia Cano D.O..    Electronically signed by: Desirae Nguyen (Alexandra), 2/11/2022    IMagnolia D.O. personally performed the services described in this documentation, as scribed by Desirae Nguyen in my presence, and it is both accurate and complete. C    The note accurately reflects work and decisions made by me.  Magnolia Cano D.O.  2/11/2022  5:31 AM

## 2022-02-11 NOTE — LETTER
2/13/2022               Yumiko Obrien  [address]  [address]  [facility] [address]        Dear Yumiko (MR#[id number])    This letter is sent in regards to your recent visit to the [facility] Emergency Department on 2/10/2022. During the visit, tests were performed to assist the physician in your medical diagnosis. A review of your tests requires that we notify you of the following:    Your urine culture was POSITIVE for a bacteria called Escherichia coli. The antibiotic prescribed for you (cephalexin) should be active to treat this bacteria. It is important that you continue taking your antibiotic until it is finished.     Please feel free to contact me at the number below if you have any questions or concerns. Thank you for your cooperation in the matter.    Sincerely,  ED Culture Follow-Up Staff  Arti Puente, PharmD    [facility], Emergency Department  [address] [place] [id number]  [phone] (Arti's phone number)  [phone] (ED Culture Line)

## 2022-02-13 LAB
BACTERIA UR CULT: ABNORMAL
BACTERIA UR CULT: ABNORMAL
SIGNIFICANT IND 70042: ABNORMAL
SITE SITE: ABNORMAL
SOURCE SOURCE: ABNORMAL

## 2022-02-13 NOTE — ED NOTES
ED Positive Culture Follow-up/Notification Note:    Date: 2/13/2022     Patient seen in the ED on 2/10/2022 for abdominal pain exacerbated by movement and urination. Patient received cephalexin 500 mg PO x1 in the ED.  1. Constipation, unspecified constipation type    2. Acute urinary tract infection       Discharge Medication List as of 2/11/2022  3:43 AM      START taking these medications    Details   cephALEXin (KEFLEX) 500 MG Cap Take 1 Capsule by mouth 4 times a day., Disp-40 Capsule, R-0, Normal             Allergies: Sulfa drugs and Sulfate     Vitals:    02/11/22 0133 02/11/22 0159 02/11/22 0209 02/11/22 0309   BP: 126/83 115/69  107/68   Pulse:   98 84   Resp:    16   Temp:    36.7 °C (98 °F)   TempSrc:    Temporal   SpO2:   98% 97%   Weight:       Height:           Final cultures:   Results     Procedure Component Value Units Date/Time    URINE CULTURE(NEW) [[id number]]  (Abnormal)  (Susceptibility) Collected: 02/11/22 0134    Order Status: Completed Specimen: Urine Updated: 02/13/22 0902     Significant Indicator POS     Source UR     Site -     Culture Result Usual skin kade <10,000 cfu/mL      Escherichia coli  >100,000 cfu/mL      Narrative:      Indication for culture:->Patient WITHOUT an indwelling Limon  catheter in place with new onset of Dysuria, Frequency,  Urgency, and/or Suprapubic pain    Susceptibility     Escherichia coli (1)     Antibiotic Interpretation Microscan   Method Status    Amikacin  [*]  Sensitive <=16 mcg/mL FAVIO Final    Ampicillin Sensitive <=8 mcg/mL FAVIO Final    Amoxicillin/CA  [*]  Sensitive <=8/4 mcg/mL FAVIO Final    Aztreonam  [*]  Sensitive <=4 mcg/mL FAVIO Final    Ceftolozane+Tazobactam  [*]  Sensitive <=2 mcg/mL FAVIO Final    Ceftriaxone Sensitive <=1 mcg/mL FAVIO Final    Ceftazidime  [*]  Sensitive <=1 mcg/mL FAVIO Final    Cefazolin Sensitive <=2 mcg/mL FAVIO Final     Breakpoints when Cefazolin is used for therapy of infections  other than uncomplicated UTIs due to  Enterobacterales are as  follows:  FAVIO and Interpretation:  <=2 S  4 I  >=8 R       Ciprofloxacin Sensitive <=0.25 mcg/mL FAVIO Final    Cefepime Sensitive <=2 mcg/mL FAVIO Final    Cefuroxime Sensitive <=4 mcg/mL FAVIO Final    Ceftazidime+Avibactam  [*]  Sensitive <=4 mcg/mL FAVIO Final    Ampicillin/sulbactam Sensitive <=4/2 mcg/mL FAVIO Final    Ertapenem  [*]  Sensitive <=0.5 mcg/mL FAVIO Final    Tobramycin Sensitive <=2 mcg/mL FAVIO Final    Nitrofurantoin Sensitive <=32 mcg/mL FAVIO Final    Gentamicin Sensitive <=2 mcg/mL FAVIO Final    Imipenem  [*]  Sensitive <=1 mcg/mL FAVIO Final    Levofloxacin Sensitive <=0.5 mcg/mL FAVIO Final    Meropenem  [*]  Sensitive <=1 mcg/mL FAVIO Final    Meropenem/Vaborbactam  [*]  Sensitive <=2 mcg/mL FAVIO Final    Minocycline Sensitive <=4 mcg/mL FAVIO Final    Pip/Tazobactam Sensitive <=8 mcg/mL FAVIO Final    Trimeth/Sulfa Sensitive <=0.5/9.5 mcg/mL FAVIO Final    Tetracycline  [*]  Sensitive <=4 mcg/mL FAVIO Final    Tigecycline Sensitive <=2 mcg/mL FAVIO Final           [*]  Suppressed Antibiotic                 URINALYSIS,CULTURE IF INDICATED [[id number]]  (Abnormal) Collected: 02/11/22 0134    Order Status: Completed Specimen: Urine Updated: 02/11/22 0205     Color Yellow     Character Cloudy     Specific Gravity 1.018     Ph 5.5     Glucose Negative mg/dL      Ketones 15 mg/dL      Protein Negative mg/dL      Bilirubin Negative     Urobilinogen, Urine 0.2     Nitrite Positive     Leukocyte Esterase Moderate     Occult Blood Small     Micro Urine Req Microscopic    Narrative:      Indication for culture:->Patient WITHOUT an indwelling Limon  catheter in place with new onset of Dysuria, Frequency,  Urgency, and/or Suprapubic pain          Plan:   Urinalysis contaminated with epithelial cells, but given patient's abdominal pain and slightly elevated WBC agree with treatment for UTI, possible pyelonephritis. Attempted to call patient to see how she is doing, but no answer so LVM. Sent letter notifying of  positive results and to continue antibiotics.    Arti Puente, PharmD  PGY2 Infectious Diseases Pharmacy Resident    Addendum 2/13/2022  Received call back from patient. She says that overall she is feeling better compared to when she came in to the ED. She says she has not yet picked up her cephalexin prescription, but does say she felt better after receiving the first dose in the ED. She says she will  the prescription tomorrow when [facility] pharmacy opens. Patient had no further questions or concerns.    Arti Puente, PharmNALLELY  PGY2 Infectious Diseases Pharmacy Resident

## 2022-04-14 ENCOUNTER — GYNECOLOGY VISIT (OUTPATIENT)
Dept: OBGYN | Facility: CLINIC | Age: 22
End: 2022-04-14
Payer: COMMERCIAL

## 2022-04-14 VITALS — WEIGHT: 191 LBS | BODY MASS INDEX: 33.83 KG/M2

## 2022-04-14 DIAGNOSIS — Z30.46 NEXPLANON REMOVAL: Primary | ICD-10-CM

## 2022-04-14 PROCEDURE — 11982 REMOVE DRUG IMPLANT DEVICE: CPT | Performed by: NURSE PRACTITIONER

## 2022-04-14 ASSESSMENT — FIBROSIS 4 INDEX: FIB4 SCORE: 0.31

## 2022-04-14 NOTE — PROGRESS NOTES
Pt here for implant removal. It was placed exactly a year ago by myself. She does not like the fact that she does not get a period on it. Denies any other issues and does not want any other birth control today.      Reviewed birth control options on which she would have a period  Reviewed immediate return to fertility after removal of device  RTC PRN

## 2022-04-14 NOTE — NON-PROVIDER
Pt here for Nexplanon removal. Pregnancy test was NA. Consent signed.     Pt states no complaints   Good# [phone]  LMP: Unknown  Pharmacy confirmed.

## (undated) DEVICE — TRAY SPINAL ANESTHESIA NON-SAFETY (10/CA)

## (undated) DEVICE — ELECTRODE DUAL RETURN W/ CORD - (50/PK)

## (undated) DEVICE — SET EXTENSION WITH 2 PORTS (48EA/CA) ***PART #2C8610 IS A SUBSTITUTE*****

## (undated) DEVICE — SUTURE 0 VICRYL PLUS CT-1 - 36 INCH (36/BX)

## (undated) DEVICE — KIT  I.V. START (100EA/CA)

## (undated) DEVICE — SUTUREABS02-0 CT1 27IN - (36EA/BX)

## (undated) DEVICE — GLOVE BIOGEL SZ 7 SURGICAL PF LTX - (50PR/BX 4BX/CA)

## (undated) DEVICE — CATHETER IV NON-SAFETY 18 GA X 1 1/4 (50/BX 4BX/CA)

## (undated) DEVICE — WATER IRRIGATION STERILE 1000ML (12EA/CA)

## (undated) DEVICE — STAPLER SKIN DISP - (6/BX 10BX/CA) VISISTAT

## (undated) DEVICE — TUBING CLEARLINK DUO-VENT - C-FLO (48EA/CA)

## (undated) DEVICE — HEAD HOLDER JUNIOR/ADULT

## (undated) DEVICE — DETERGENT RENUZYME PLUS 10 OZ PACKET (50/BX)

## (undated) DEVICE — PACK C-SECTION (2EA/CA)

## (undated) DEVICE — SODIUM CHL IRRIGATION 0.9% 1000ML (12EA/CA)